# Patient Record
Sex: FEMALE | Race: WHITE | NOT HISPANIC OR LATINO | Employment: FULL TIME | URBAN - METROPOLITAN AREA
[De-identification: names, ages, dates, MRNs, and addresses within clinical notes are randomized per-mention and may not be internally consistent; named-entity substitution may affect disease eponyms.]

---

## 2017-01-03 DIAGNOSIS — G93.0 ARACHNOID CYST: Primary | ICD-10-CM

## 2017-01-04 ENCOUNTER — OFFICE VISIT (OUTPATIENT)
Dept: FAMILY MEDICINE CLINIC | Facility: CLINIC | Age: 37
End: 2017-01-04

## 2017-01-04 VITALS
RESPIRATION RATE: 16 BRPM | OXYGEN SATURATION: 98 % | BODY MASS INDEX: 34.03 KG/M2 | SYSTOLIC BLOOD PRESSURE: 118 MMHG | HEIGHT: 67 IN | DIASTOLIC BLOOD PRESSURE: 84 MMHG | WEIGHT: 216.8 LBS | TEMPERATURE: 98.5 F | HEART RATE: 86 BPM

## 2017-01-04 DIAGNOSIS — R79.0 LOW FERRITIN LEVEL: ICD-10-CM

## 2017-01-04 DIAGNOSIS — F33.42 RECURRENT MAJOR DEPRESSIVE DISORDER, IN FULL REMISSION (HCC): Primary | ICD-10-CM

## 2017-01-04 DIAGNOSIS — G43.719 INTRACTABLE CHRONIC MIGRAINE WITHOUT AURA AND WITHOUT STATUS MIGRAINOSUS: ICD-10-CM

## 2017-01-04 PROBLEM — G93.0 ARACHNOID CYST: Status: ACTIVE | Noted: 2017-01-04

## 2017-01-04 LAB
BASOPHILS # BLD AUTO: 0.03 10*3/MM3 (ref 0–0.2)
BASOPHILS NFR BLD AUTO: 0.6 % (ref 0–1.5)
EOSINOPHIL # BLD AUTO: 0.09 10*3/MM3 (ref 0–0.7)
EOSINOPHIL NFR BLD AUTO: 1.8 % (ref 0.3–6.2)
ERYTHROCYTE [DISTWIDTH] IN BLOOD BY AUTOMATED COUNT: 12.2 % (ref 11.7–13)
FERRITIN SERPL-MCNC: 8.74 NG/ML (ref 13–150)
HCT VFR BLD AUTO: 40 % (ref 35.6–45.5)
HGB BLD-MCNC: 13.1 G/DL (ref 11.9–15.5)
IMM GRANULOCYTES # BLD: 0 10*3/MM3 (ref 0–0.03)
IMM GRANULOCYTES NFR BLD: 0 % (ref 0–0.5)
IRON SATN MFR SERPL: 9 % (ref 20–50)
IRON SERPL-MCNC: 52 MCG/DL (ref 37–145)
LYMPHOCYTES # BLD AUTO: 1.56 10*3/MM3 (ref 0.9–4.8)
LYMPHOCYTES NFR BLD AUTO: 31.7 % (ref 19.6–45.3)
MCH RBC QN AUTO: 30 PG (ref 26.9–32)
MCHC RBC AUTO-ENTMCNC: 32.8 G/DL (ref 32.4–36.3)
MCV RBC AUTO: 91.7 FL (ref 80.5–98.2)
MONOCYTES # BLD AUTO: 0.35 10*3/MM3 (ref 0.2–1.2)
MONOCYTES NFR BLD AUTO: 7.1 % (ref 5–12)
NEUTROPHILS # BLD AUTO: 2.89 10*3/MM3 (ref 1.9–8.1)
NEUTROPHILS NFR BLD AUTO: 58.8 % (ref 42.7–76)
PLATELET # BLD AUTO: 372 10*3/MM3 (ref 140–500)
RBC # BLD AUTO: 4.36 10*6/MM3 (ref 3.9–5.2)
TIBC SERPL-MCNC: 556 MCG/DL
UIBC SERPL-MCNC: 504 MCG/DL
WBC # BLD AUTO: 4.92 10*3/MM3 (ref 4.5–10.7)

## 2017-01-04 PROCEDURE — 99214 OFFICE O/P EST MOD 30 MIN: CPT | Performed by: FAMILY MEDICINE

## 2017-01-04 RX ORDER — BUPROPION HYDROCHLORIDE 100 MG/1
100 TABLET ORAL 2 TIMES DAILY
Qty: 180 TABLET | Refills: 3 | Status: SHIPPED | OUTPATIENT
Start: 2017-01-04 | End: 2018-01-19

## 2017-01-04 RX ORDER — CITALOPRAM 10 MG/1
10 TABLET ORAL DAILY
Qty: 90 TABLET | Refills: 3 | Status: SHIPPED | OUTPATIENT
Start: 2017-01-04 | End: 2018-01-19 | Stop reason: SDUPTHER

## 2017-01-04 RX ORDER — DOXYCYCLINE HYCLATE 50 MG/1
324 CAPSULE, GELATIN COATED ORAL
Qty: 90 TABLET | Refills: 3 | Status: SHIPPED | OUTPATIENT
Start: 2017-01-04 | End: 2019-02-12 | Stop reason: HOSPADM

## 2017-01-04 NOTE — MR AVS SNAPSHOT
Gama Andrade   1/4/2017 10:45 AM   Office Visit    Dept Phone:  [phone]   Encounter #:  [id number]    Provider:  Taz Kang MD   Department:  [facility] PRIMARY CARE                Your Full Care Plan              Today's Medication Changes          These changes are accurate as of: 1/4/17 11:30 AM.  If you have any questions, ask your nurse or doctor.               New Medication(s)Ordered:     ferrous gluconate 324 MG tablet   Commonly known as:  FERGON   Take 1 tablet by mouth Daily With Breakfast.   Started by:  Taz Kang MD         Medication(s)that have changed:     buPROPion 100 MG tablet   Commonly known as:  WELLBUTRIN   Take 1 tablet by mouth 2 (Two) Times a Day.   What changed:  See the new instructions.   Changed by:  Taz Kang MD       citalopram 10 MG tablet   Commonly known as:  CeleXA   Take 1 tablet by mouth Daily.   What changed:  See the new instructions.   Changed by:  Taz Kang MD         Stop taking medication(s)listed here:     predniSONE 20 MG tablet   Commonly known as:  DELTASONE   Stopped by:  Taz Kang MD                Where to Get Your Medications      These medications were sent to [facility] [address] [place], [place] - [address] [place] [address] - [phone] [facility] [phone] [place] [address]     Phone:  [phone]     buPROPion 100 MG tablet    citalopram 10 MG tablet    ferrous gluconate 324 MG tablet                  Your Updated Medication List          This list is accurate as of: 1/4/17 11:30 AM.  Always use your most recent med list.                buPROPion 100 MG tablet   Commonly known as:  WELLBUTRIN   Take 1 tablet by mouth 2 (Two) Times a Day.       citalopram 10 MG tablet   Commonly known as:  CeleXA   Take 1 tablet by mouth Daily.       ferrous gluconate 324 MG tablet   Commonly known as:  FERGON   Take 1 tablet by mouth Daily With Breakfast.               We Performed  "the Following     CBC & Differential     Ferritin     Iron and TIBC       You Were Diagnosed With        Codes Comments    Recurrent major depressive disorder, in full remission    -  Primary ICD-10-CM: F33.42  ICD-9-CM: 296.36     Low ferritin level     ICD-10-CM: R79.0  ICD-9-CM: 790.6       Instructions     None    Patient Instructions History      Upcoming Appointments     Visit Type Date Time Department    OFFICE VISIT 1/4/2017 10:45 AM [facility] Signup     Our records indicate that you have an active HASH account.    You can view your After Visit Summary by going to [employer] [facility] and logging in with your [facility] username and password.  If you don't have a [facility] username and password but a parent or guardian has access to your record, the parent or guardian should login with their own [facility] username and password and access your record to view the After Visit Summary.    If you have questions, you can email [email or web address] or call [phone] to talk to our [facility] staff.  Remember, [facility] is NOT to be used for urgent needs.  For medical emergencies, dial 911.               Other Info from Your Visit           Allergies     Eletriptan      Sumatriptan  Palpitations      Reason for Visit     Depression           Vital Signs     Blood Pressure Pulse Temperature Respirations Height Weight    118/84 86 98.5 °F (36.9 °C) (Oral) 16 67\" (170.2 cm) 216 lb 12.8 oz (98.3 kg)    Oxygen Saturation Body Mass Index Smoking Status             98% 33.96 kg/m2 Never Smoker         Problems and Diagnoses Noted     Arachnoid cyst    Migraines    Low ferritin level    Recurrent major depressive disorder, in full remission        "

## 2017-01-04 NOTE — PROGRESS NOTES
"Subjective   Gaam Andrade is a 36 y.o. female.     Chief Complaint   Patient presents with   • Depression        History of Present Illness    Previous history of anxiety and major depression.  Now in remission.  She continues her medication.  Ran out of Celexa yesterday.  No adverse side effects at this time.  History of intractable headaches, followed by neurology also.  Reportedly stable..  History of asymptomatic arachnoid cyst.  History of weight loss surgery.  Gastric Bypass surgery.  Her ferritin levels have been low.  Iron levels overall okay.  Saturations low.  She's felt tired but she thinks it's from raising her foster children.  She is a single mother.  She does get help through her community.  No major menstrual issues.  She has never felt different taking iron tablets.  No adverse side effects from iron tablets.      The following portions of the patient's history were reviewed and updated as appropriate: allergies, current medications, past family history, past medical history, past social history, past surgical history and problem list.          Review of Systems   Constitutional: Positive for fatigue. Negative for activity change and appetite change.   Respiratory: Negative for chest tightness and shortness of breath.    Cardiovascular: Negative for chest pain, palpitations and leg swelling.   Neurological: Negative for headaches.   Psychiatric/Behavioral: Negative for confusion.       Objective   Blood pressure 118/84, pulse 86, temperature 98.5 °F (36.9 °C), temperature source Oral, resp. rate 16, height 67\" (170.2 cm), weight 216 lb 12.8 oz (98.3 kg), SpO2 98 %.  Physical Exam   Constitutional: She appears well-developed and well-nourished. No distress.   Neck: No thyromegaly present.   Cardiovascular: Normal rate, regular rhythm, normal heart sounds and intact distal pulses.    Pulmonary/Chest: Effort normal and breath sounds normal.   Musculoskeletal: She exhibits no edema.   Skin: Skin is " warm and dry.   Psychiatric: She has a normal mood and affect. Her behavior is normal. Judgment and thought content normal.   Nursing note and vitals reviewed.      Assessment/Plan   Gama was seen today for depression.    Diagnoses and all orders for this visit:    Recurrent major depressive disorder, in full remission  -     buPROPion (WELLBUTRIN) 100 MG tablet; Take 1 tablet by mouth 2 (Two) Times a Day.  -     citalopram (CeleXA) 10 MG tablet; Take 1 tablet by mouth Daily.    Low ferritin level  -     CBC & Differential  -     Iron and TIBC  -     Ferritin    Intractable chronic migraine without aura and without status migrainosus    Other orders  -     ferrous gluconate (FERGON) 324 MG tablet; Take 1 tablet by mouth Daily With Breakfast.      History of depression anxiety.  She continues with her medication.  Refill given.  No adverse side effects.  She continues with her mindfulness meditation therapy.  I'm recommending cognitive behavioral therapy with a psychologist.  I gave her a brochure for a group that we refer to.  She will call with questions.  Follow-up within 1 year.  Continue with her gynecologist for routine care.    History of headaches.  Overall stable.    History of gastric bypass weight loss surgery, borderline low ferritin.  Repeat iron panels today.  I'm recommending iron tablets daily, prescription sent.  Call with GI issues.

## 2017-01-05 NOTE — PROGRESS NOTES
Iron stores are lower, not unexpected.  I recommend taking the iron tablet at least once a day, twice a day if you can tolerate it.    She needs a repeat CBC and iron panel in about 3 months.  Lab visit only, although she is welcome to see me if she wishes.

## 2017-01-19 ENCOUNTER — HOSPITAL ENCOUNTER (OUTPATIENT)
Dept: MRI IMAGING | Facility: HOSPITAL | Age: 37
Discharge: HOME | End: 2017-01-19
Attending: NEUROLOGICAL SURGERY | Admitting: NEUROLOGICAL SURGERY

## 2017-01-19 DIAGNOSIS — G93.0 ARACHNOID CYST: ICD-10-CM

## 2017-01-19 DIAGNOSIS — R79.0 LOW FERRITIN LEVEL: Primary | ICD-10-CM

## 2017-01-19 DIAGNOSIS — F33.42 RECURRENT MAJOR DEPRESSIVE DISORDER, IN FULL REMISSION (HCC): ICD-10-CM

## 2017-01-19 PROCEDURE — 0 GADOBENATE DIMEGLUMINE 529 MG/ML SOLUTION: Performed by: NEUROLOGICAL SURGERY

## 2017-01-19 PROCEDURE — 70553 MRI BRAIN STEM W/O & W/DYE: CPT

## 2017-01-19 PROCEDURE — A9577 INJ MULTIHANCE: HCPCS | Performed by: NEUROLOGICAL SURGERY

## 2017-01-19 RX ADMIN — GADOBENATE DIMEGLUMINE 20 ML: 529 INJECTION, SOLUTION INTRAVENOUS at 15:15

## 2017-01-20 ENCOUNTER — OFFICE VISIT (OUTPATIENT)
Dept: ONCOLOGY | Facility: CLINIC | Age: 37
End: 2017-01-20

## 2017-01-20 ENCOUNTER — LAB (OUTPATIENT)
Dept: ONCOLOGY | Facility: HOSPITAL | Age: 37
End: 2017-01-20

## 2017-01-20 VITALS — WEIGHT: 218.4 LBS | BODY MASS INDEX: 34.28 KG/M2 | HEIGHT: 67 IN

## 2017-01-20 DIAGNOSIS — K95.89 IRON DEFICIENCY ANEMIA FOLLOWING BARIATRIC SURGERY: ICD-10-CM

## 2017-01-20 DIAGNOSIS — R79.0 LOW FERRITIN LEVEL: ICD-10-CM

## 2017-01-20 DIAGNOSIS — D50.8 IRON DEFICIENCY ANEMIA FOLLOWING BARIATRIC SURGERY: ICD-10-CM

## 2017-01-20 DIAGNOSIS — R79.0 LOW FERRITIN LEVEL: Primary | ICD-10-CM

## 2017-01-20 LAB
BASOPHILS # BLD AUTO: 0.04 10*3/MM3 (ref 0–0.2)
BASOPHILS NFR BLD AUTO: 0.5 % (ref 0–1.5)
DEPRECATED RDW RBC AUTO: 37.7 FL (ref 37–54)
EOSINOPHIL # BLD AUTO: 0.14 10*3/MM3 (ref 0–0.7)
EOSINOPHIL NFR BLD AUTO: 1.8 % (ref 0.3–6.2)
ERYTHROCYTE [DISTWIDTH] IN BLOOD BY AUTOMATED COUNT: 11.7 % (ref 11.7–13)
HCT VFR BLD AUTO: 38 % (ref 35.6–45.5)
HGB BLD-MCNC: 13 G/DL (ref 11.9–15.5)
IMM GRANULOCYTES # BLD: 0.02 10*3/MM3 (ref 0–0.03)
IMM GRANULOCYTES NFR BLD: 0.3 % (ref 0–0.5)
LYMPHOCYTES # BLD AUTO: 1.87 10*3/MM3 (ref 0.9–4.8)
LYMPHOCYTES NFR BLD AUTO: 24.1 % (ref 19.6–45.3)
MCH RBC QN AUTO: 29.7 PG (ref 26.9–32)
MCHC RBC AUTO-ENTMCNC: 34.2 G/DL (ref 32.4–36.3)
MCV RBC AUTO: 87 FL (ref 80.5–98.2)
MONOCYTES # BLD AUTO: 0.5 10*3/MM3 (ref 0.2–1.2)
MONOCYTES NFR BLD AUTO: 6.5 % (ref 5–12)
NEUTROPHILS # BLD AUTO: 5.18 10*3/MM3 (ref 1.9–8.1)
NEUTROPHILS NFR BLD AUTO: 66.8 % (ref 42.7–76)
NRBC BLD MANUAL-RTO: 0.3 /100 WBC (ref 0–0)
PLAT MORPH BLD: NORMAL
PLATELET # BLD AUTO: 298 10*3/MM3 (ref 140–500)
PMV BLD AUTO: 11.2 FL (ref 6–12)
RBC # BLD AUTO: 4.37 10*6/MM3 (ref 3.9–5.2)
RBC MORPH BLD: NORMAL
WBC MORPH BLD: NORMAL
WBC NRBC COR # BLD: 7.75 10*3/MM3 (ref 4.5–10.7)

## 2017-01-20 PROCEDURE — 99213 OFFICE O/P EST LOW 20 MIN: CPT | Performed by: INTERNAL MEDICINE

## 2017-01-20 PROCEDURE — 85025 COMPLETE CBC W/AUTO DIFF WBC: CPT | Performed by: INTERNAL MEDICINE

## 2017-01-20 PROCEDURE — 85007 BL SMEAR W/DIFF WBC COUNT: CPT | Performed by: INTERNAL MEDICINE

## 2017-01-20 PROCEDURE — 36415 COLL VENOUS BLD VENIPUNCTURE: CPT

## 2017-01-20 RX ORDER — SODIUM CHLORIDE 9 MG/ML
250 INJECTION, SOLUTION INTRAVENOUS ONCE
Status: CANCELLED | OUTPATIENT
Start: 2017-01-25

## 2017-01-20 RX ORDER — DIPHENHYDRAMINE HCL 25 MG
25 CAPSULE ORAL ONCE
Status: CANCELLED | OUTPATIENT
Start: 2017-01-25

## 2017-01-20 RX ORDER — ETONOGESTREL AND ETHINYL ESTRADIOL 11.7; 2.7 MG/1; MG/1
INSERT, EXTENDED RELEASE VAGINAL
COMMUNITY
Start: 2016-03-28 | End: 2018-01-19

## 2017-01-20 NOTE — PROGRESS NOTES
Subjective   Iron deficiency in the setting of previous gastric bypass surgery requiring IV iron therapy.   HISTORY OF PRESENT ILLNESS:   The patient is a 36-year-old female with a previous history of gastric bypass surgery in 2010.  She has required multiple doses of IV iron in the past due to recurring iron deficiency.  She had been seen in our practice in 2013 for this same issue.  She received 2 doses of IV Feraheme in May of 2013 and subsequently required 2 additional doses of IV Feraheme as an outpatient in January 2015.    She had recent labs performed on 1/4/2017 that showed she is again significantly iron deficient with a ferritin level of 8.7 and an iron saturation of 9%.  She reports being extremely fatigued.  She has not seen any external bleeding.  She's not had any other major illnesses in the past 2 years.  We will again schedule her for 2 doses of IV Feraheme as an outpatient as soon as possible.  History of Present Illness      Past Medical History, Past Surgical History, Social History, Family History have been reviewed and are without significant changes except as mentioned.    Review of Systems   Constitutional: Positive for fatigue. Negative for activity change, appetite change, fever and unexpected weight change.   HENT: Negative for hearing loss, nosebleeds, trouble swallowing and voice change.    Eyes: Negative for visual disturbance.   Respiratory: Negative for cough, shortness of breath and wheezing.    Cardiovascular: Negative for chest pain and palpitations.   Gastrointestinal: Negative for abdominal pain, diarrhea, nausea and vomiting.   Genitourinary: Negative for difficulty urinating, frequency, hematuria and urgency.   Musculoskeletal: Negative for back pain and neck pain.   Skin: Negative for rash.   Neurological: Negative for dizziness, seizures, syncope and headaches.   Hematological: Negative for adenopathy. Does not bruise/bleed easily.   Psychiatric/Behavioral: Negative for  "behavioral problems. The patient is not nervous/anxious.       A comprehensive 14 point review of systems was performed and was negative except as mentioned.    Medications:  The current medication list was reviewed in the EMR    ALLERGIES:    Allergies   Allergen Reactions   • Eletriptan    • Sumatriptan Palpitations       Objective      Vitals:    01/20/17 1456   Weight: 218 lb 6.4 oz (99.1 kg)   Height: 66.93\" (170 cm)  Comment: new ht   PainSc: 0-No pain     Current Status 1/20/2017   ECOG score 0       Physical Exam   Constitutional: She is oriented to person, place, and time. She appears well-developed and well-nourished. No distress.   HENT:   Head: Normocephalic.   Eyes: Conjunctivae and EOM are normal. Pupils are equal, round, and reactive to light. No scleral icterus.   Neck: Normal range of motion. Neck supple. No JVD present. No thyromegaly present.   Cardiovascular: Normal rate and regular rhythm.  Exam reveals no gallop and no friction rub.    No murmur heard.  Pulmonary/Chest: Effort normal and breath sounds normal. She has no wheezes. She has no rales.   Abdominal: Soft. She exhibits no distension and no mass. There is no tenderness.   Musculoskeletal: Normal range of motion. She exhibits no edema or deformity.   Lymphadenopathy:     She has no cervical adenopathy.   Neurological: She is alert and oriented to person, place, and time. She has normal reflexes. No cranial nerve deficit.   Skin: Skin is warm and dry. No rash noted. No erythema.   Psychiatric: She has a normal mood and affect. Her behavior is normal. Judgment normal.         RECENT LABS:  Hematology WBC   Date Value Ref Range Status   01/20/2017 7.75 4.50 - 10.70 10*3/mm3 Preliminary   01/04/2017 4.92 4.50 - 10.70 10*3/mm3 Final     RBC   Date Value Ref Range Status   01/20/2017 4.37 3.90 - 5.20 10*6/mm3 Preliminary   01/04/2017 4.36 3.90 - 5.20 10*6/mm3 Final     HEMOGLOBIN   Date Value Ref Range Status   01/20/2017 13.0 11.9 - 15.5 g/dL " Preliminary   01/04/2017 13.1 11.9 - 15.5 g/dL Final     HEMATOCRIT   Date Value Ref Range Status   01/20/2017 38.0 35.6 - 45.5 % Preliminary   01/04/2017 40.0 35.6 - 45.5 % Final     PLATELETS   Date Value Ref Range Status   01/20/2017 298 140 - 500 10*3/mm3 Preliminary   01/04/2017 372 140 - 500 10*3/mm3 Final            Lab Results   Component Value Date    IRON 45 06/21/2016    TIBC 556 01/04/2017    FERRITIN 8.74 (L) 01/04/2017     Iron Saturation 20 - 50 % 9 (L)           Assessment/Plan   1.  Recurring iron deficiency in a patient with previous gastric bypass surgery.  She will require additional IV iron therapy at this point.    Plan  1.  Patient will be scheduled for IV iron therapy as an outpatient with IV Feraheme 510 mg IV weekly for 2 weeks.  2.  We will not schedule routine follow-up in our office but she will continue to have her labs checked every 6 months or so at her primary care office.  She can be referred back to us in the future if she again becomes iron deficient.  It appears that she usually requires IV iron on the order of every 24 months.                  1/20/2017      CC:

## 2017-01-23 RX ORDER — BUPROPION HYDROCHLORIDE 100 MG/1
TABLET ORAL
Qty: 90 TABLET | Refills: 2 | Status: SHIPPED | OUTPATIENT
Start: 2017-01-23 | End: 2017-05-07 | Stop reason: SDUPTHER

## 2017-01-24 ENCOUNTER — INFUSION (OUTPATIENT)
Dept: ONCOLOGY | Facility: HOSPITAL | Age: 37
End: 2017-01-24

## 2017-01-24 VITALS
DIASTOLIC BLOOD PRESSURE: 80 MMHG | BODY MASS INDEX: 33.84 KG/M2 | TEMPERATURE: 98.6 F | WEIGHT: 215.6 LBS | HEART RATE: 73 BPM | SYSTOLIC BLOOD PRESSURE: 140 MMHG

## 2017-01-24 DIAGNOSIS — D50.8 IRON DEFICIENCY ANEMIA FOLLOWING BARIATRIC SURGERY: Primary | ICD-10-CM

## 2017-01-24 DIAGNOSIS — K95.89 IRON DEFICIENCY ANEMIA FOLLOWING BARIATRIC SURGERY: Primary | ICD-10-CM

## 2017-01-24 DIAGNOSIS — R79.0 LOW FERRITIN LEVEL: ICD-10-CM

## 2017-01-24 PROCEDURE — 63710000001 DIPHENHYDRAMINE PER 50 MG: Performed by: INTERNAL MEDICINE

## 2017-01-24 PROCEDURE — 96375 TX/PRO/DX INJ NEW DRUG ADDON: CPT | Performed by: INTERNAL MEDICINE

## 2017-01-24 PROCEDURE — 25010000002 FERUMOXYTOL 510 MG/17ML SOLUTION 510 MG VIAL: Performed by: INTERNAL MEDICINE

## 2017-01-24 PROCEDURE — 96374 THER/PROPH/DIAG INJ IV PUSH: CPT | Performed by: INTERNAL MEDICINE

## 2017-01-24 RX ORDER — DIPHENHYDRAMINE HCL 25 MG
25 CAPSULE ORAL ONCE
Status: COMPLETED | OUTPATIENT
Start: 2017-01-24 | End: 2017-01-24

## 2017-01-24 RX ORDER — SODIUM CHLORIDE 9 MG/ML
250 INJECTION, SOLUTION INTRAVENOUS ONCE
Status: CANCELLED | OUTPATIENT
Start: 2017-01-25

## 2017-01-24 RX ORDER — DIPHENHYDRAMINE HCL 25 MG
25 CAPSULE ORAL ONCE
Status: CANCELLED | OUTPATIENT
Start: 2017-01-25

## 2017-01-24 RX ORDER — SODIUM CHLORIDE 9 MG/ML
250 INJECTION, SOLUTION INTRAVENOUS ONCE
Status: COMPLETED | OUTPATIENT
Start: 2017-01-24 | End: 2017-01-24

## 2017-01-24 RX ADMIN — FAMOTIDINE 20 MG: 10 INJECTION, SOLUTION INTRAVENOUS at 11:03

## 2017-01-24 RX ADMIN — SODIUM CHLORIDE 250 ML: 9 INJECTION, SOLUTION INTRAVENOUS at 11:01

## 2017-01-24 RX ADMIN — FERUMOXYTOL 510 MG: 510 INJECTION INTRAVENOUS at 11:23

## 2017-01-24 RX ADMIN — DIPHENHYDRAMINE HYDROCHLORIDE 25 MG: 25 CAPSULE ORAL at 11:03

## 2017-01-31 ENCOUNTER — INFUSION (OUTPATIENT)
Dept: ONCOLOGY | Facility: HOSPITAL | Age: 37
End: 2017-01-31

## 2017-01-31 VITALS
DIASTOLIC BLOOD PRESSURE: 84 MMHG | BODY MASS INDEX: 34.06 KG/M2 | SYSTOLIC BLOOD PRESSURE: 122 MMHG | TEMPERATURE: 98.7 F | WEIGHT: 217 LBS

## 2017-01-31 DIAGNOSIS — D50.8 IRON DEFICIENCY ANEMIA FOLLOWING BARIATRIC SURGERY: Primary | ICD-10-CM

## 2017-01-31 DIAGNOSIS — R79.0 LOW FERRITIN LEVEL: ICD-10-CM

## 2017-01-31 DIAGNOSIS — K95.89 IRON DEFICIENCY ANEMIA FOLLOWING BARIATRIC SURGERY: Primary | ICD-10-CM

## 2017-01-31 PROCEDURE — 96365 THER/PROPH/DIAG IV INF INIT: CPT | Performed by: INTERNAL MEDICINE

## 2017-01-31 PROCEDURE — 63710000001 DIPHENHYDRAMINE PER 50 MG: Performed by: INTERNAL MEDICINE

## 2017-01-31 PROCEDURE — 25010000002 FERUMOXYTOL 510 MG/17ML SOLUTION 510 MG VIAL: Performed by: INTERNAL MEDICINE

## 2017-01-31 RX ORDER — SODIUM CHLORIDE 9 MG/ML
250 INJECTION, SOLUTION INTRAVENOUS ONCE
Status: CANCELLED | OUTPATIENT
Start: 2017-02-07

## 2017-01-31 RX ORDER — SODIUM CHLORIDE 9 MG/ML
250 INJECTION, SOLUTION INTRAVENOUS ONCE
Status: COMPLETED | OUTPATIENT
Start: 2017-01-31 | End: 2017-01-31

## 2017-01-31 RX ORDER — DIPHENHYDRAMINE HCL 25 MG
25 CAPSULE ORAL ONCE
Status: CANCELLED | OUTPATIENT
Start: 2017-02-07

## 2017-01-31 RX ORDER — DIPHENHYDRAMINE HCL 25 MG
25 CAPSULE ORAL ONCE
Status: COMPLETED | OUTPATIENT
Start: 2017-01-31 | End: 2017-01-31

## 2017-01-31 RX ADMIN — DIPHENHYDRAMINE HYDROCHLORIDE 25 MG: 25 CAPSULE ORAL at 10:55

## 2017-01-31 RX ADMIN — FERUMOXYTOL 510 MG: 510 INJECTION INTRAVENOUS at 11:26

## 2017-01-31 RX ADMIN — FAMOTIDINE 20 MG: 10 INJECTION, SOLUTION INTRAVENOUS at 10:55

## 2017-01-31 RX ADMIN — SODIUM CHLORIDE 250 ML: 900 INJECTION, SOLUTION INTRAVENOUS at 10:50

## 2017-05-08 RX ORDER — BUPROPION HYDROCHLORIDE 100 MG/1
TABLET ORAL
Qty: 90 TABLET | Refills: 1 | Status: SHIPPED | OUTPATIENT
Start: 2017-05-08 | End: 2017-07-09 | Stop reason: SDUPTHER

## 2017-07-10 RX ORDER — BUPROPION HYDROCHLORIDE 100 MG/1
TABLET ORAL
Qty: 90 TABLET | Refills: 1 | Status: SHIPPED | OUTPATIENT
Start: 2017-07-10 | End: 2017-10-07 | Stop reason: SDUPTHER

## 2017-10-09 RX ORDER — BUPROPION HYDROCHLORIDE 100 MG/1
TABLET ORAL
Qty: 90 TABLET | Refills: 0 | Status: SHIPPED | OUTPATIENT
Start: 2017-10-09 | End: 2017-11-02 | Stop reason: SDUPTHER

## 2017-11-02 RX ORDER — BUPROPION HYDROCHLORIDE 100 MG/1
TABLET ORAL
Qty: 90 TABLET | Refills: 1 | Status: SHIPPED | OUTPATIENT
Start: 2017-11-02 | End: 2018-01-21 | Stop reason: SDUPTHER

## 2018-01-02 RX ORDER — BUPROPION HYDROCHLORIDE 100 MG/1
TABLET ORAL
Qty: 90 TABLET | Refills: 0 | OUTPATIENT
Start: 2018-01-02

## 2018-01-02 NOTE — TELEPHONE ENCOUNTER
Patient needs appointment for further refills.  Please let patient know we can send in a bridge prescription as soon as follow-up appointment is made.

## 2018-01-19 ENCOUNTER — OFFICE VISIT (OUTPATIENT)
Dept: FAMILY MEDICINE CLINIC | Facility: CLINIC | Age: 38
End: 2018-01-19

## 2018-01-19 VITALS
SYSTOLIC BLOOD PRESSURE: 136 MMHG | WEIGHT: 191.8 LBS | BODY MASS INDEX: 30.1 KG/M2 | HEART RATE: 97 BPM | HEIGHT: 67 IN | OXYGEN SATURATION: 100 % | DIASTOLIC BLOOD PRESSURE: 84 MMHG | TEMPERATURE: 98 F

## 2018-01-19 DIAGNOSIS — D50.8 IRON DEFICIENCY ANEMIA FOLLOWING BARIATRIC SURGERY: ICD-10-CM

## 2018-01-19 DIAGNOSIS — F33.42 RECURRENT MAJOR DEPRESSIVE DISORDER, IN FULL REMISSION (HCC): ICD-10-CM

## 2018-01-19 DIAGNOSIS — F33.1 MODERATE EPISODE OF RECURRENT MAJOR DEPRESSIVE DISORDER (HCC): Primary | ICD-10-CM

## 2018-01-19 DIAGNOSIS — K95.89 IRON DEFICIENCY ANEMIA FOLLOWING BARIATRIC SURGERY: ICD-10-CM

## 2018-01-19 DIAGNOSIS — R53.83 FATIGUE, UNSPECIFIED TYPE: ICD-10-CM

## 2018-01-19 PROBLEM — F33.41 RECURRENT MAJOR DEPRESSIVE DISORDER, IN PARTIAL REMISSION (HCC): Status: ACTIVE | Noted: 2017-01-04

## 2018-01-19 LAB
ALBUMIN SERPL-MCNC: 4.1 G/DL (ref 3.5–5.2)
ALBUMIN/GLOB SERPL: 1.7 G/DL
ALP SERPL-CCNC: 90 U/L (ref 39–117)
ALT SERPL-CCNC: 20 U/L (ref 1–33)
AST SERPL-CCNC: 21 U/L (ref 1–32)
BASOPHILS # BLD AUTO: 0.02 10*3/MM3 (ref 0–0.2)
BASOPHILS NFR BLD AUTO: 0.4 % (ref 0–1.5)
BILIRUB SERPL-MCNC: 0.2 MG/DL (ref 0.1–1.2)
BUN SERPL-MCNC: 12 MG/DL (ref 6–20)
BUN/CREAT SERPL: 14.6 (ref 7–25)
CALCIUM SERPL-MCNC: 8.9 MG/DL (ref 8.6–10.5)
CHLORIDE SERPL-SCNC: 102 MMOL/L (ref 98–107)
CO2 SERPL-SCNC: 22.9 MMOL/L (ref 22–29)
CREAT SERPL-MCNC: 0.82 MG/DL (ref 0.57–1)
EOSINOPHIL # BLD AUTO: 0.08 10*3/MM3 (ref 0–0.7)
EOSINOPHIL NFR BLD AUTO: 1.7 % (ref 0.3–6.2)
ERYTHROCYTE [DISTWIDTH] IN BLOOD BY AUTOMATED COUNT: 12.2 % (ref 11.7–13)
FERRITIN SERPL-MCNC: 81.21 NG/ML (ref 13–150)
GLOBULIN SER CALC-MCNC: 2.4 GM/DL
GLUCOSE SERPL-MCNC: 97 MG/DL (ref 65–99)
HCT VFR BLD AUTO: 40.1 % (ref 35.6–45.5)
HGB BLD-MCNC: 13.1 G/DL (ref 11.9–15.5)
IMM GRANULOCYTES # BLD: 0 10*3/MM3 (ref 0–0.03)
IMM GRANULOCYTES NFR BLD: 0 % (ref 0–0.5)
IRON SATN MFR SERPL: 34 % (ref 20–50)
IRON SERPL-MCNC: 121 MCG/DL (ref 37–145)
LYMPHOCYTES # BLD AUTO: 1.37 10*3/MM3 (ref 0.9–4.8)
LYMPHOCYTES NFR BLD AUTO: 29.3 % (ref 19.6–45.3)
MCH RBC QN AUTO: 30.8 PG (ref 26.9–32)
MCHC RBC AUTO-ENTMCNC: 32.7 G/DL (ref 32.4–36.3)
MCV RBC AUTO: 94.4 FL (ref 80.5–98.2)
MONOCYTES # BLD AUTO: 0.31 10*3/MM3 (ref 0.2–1.2)
MONOCYTES NFR BLD AUTO: 6.6 % (ref 5–12)
NEUTROPHILS # BLD AUTO: 2.89 10*3/MM3 (ref 1.9–8.1)
NEUTROPHILS NFR BLD AUTO: 62 % (ref 42.7–76)
PLATELET # BLD AUTO: 297 10*3/MM3 (ref 140–500)
POTASSIUM SERPL-SCNC: 3.9 MMOL/L (ref 3.5–5.2)
PROT SERPL-MCNC: 6.5 G/DL (ref 6–8.5)
RBC # BLD AUTO: 4.25 10*6/MM3 (ref 3.9–5.2)
SODIUM SERPL-SCNC: 139 MMOL/L (ref 136–145)
TIBC SERPL-MCNC: 360 MCG/DL
TSH SERPL DL<=0.005 MIU/L-ACNC: 3.98 MIU/ML (ref 0.27–4.2)
UIBC SERPL-MCNC: 239 MCG/DL
VIT B12 SERPL-MCNC: 288 PG/ML (ref 211–946)
WBC # BLD AUTO: 4.67 10*3/MM3 (ref 4.5–10.7)

## 2018-01-19 PROCEDURE — 99214 OFFICE O/P EST MOD 30 MIN: CPT | Performed by: FAMILY MEDICINE

## 2018-01-19 RX ORDER — CITALOPRAM 10 MG/1
10 TABLET ORAL DAILY
Qty: 90 TABLET | Refills: 2 | Status: SHIPPED | OUTPATIENT
Start: 2018-01-19 | End: 2018-01-19 | Stop reason: SDUPTHER

## 2018-01-19 RX ORDER — CITALOPRAM 20 MG/1
20 TABLET ORAL DAILY
Qty: 90 TABLET | Refills: 1 | Status: SHIPPED | OUTPATIENT
Start: 2018-01-19 | End: 2019-02-12 | Stop reason: HOSPADM

## 2018-01-19 NOTE — PROGRESS NOTES
"Subjective   Gama Andrade is a 37 y.o. female.     Chief Complaint   Patient presents with   • Depression     she about 2 years ago the celexa was changed to 10 from 20 and would like too see if we can move it back up.        History of Present Illness    Depression.  Exacerbation.  Long-standing bupropion and Celexa use.  We had lowered her dose of Celexa from 20 mg a.10 mg day a few years ago.  Last few months she's had some stressors.  They no longer have foster children.  She is .  She has hypersomnolence.  She has decreased interest in pleasurable activities.  Trouble getting out of bed.  Napping in the afternoon.  More depression symptoms than some fatigue.  Not exercising much.  No undergoing to her counselor.  Lives in a safe environment.  No alcohol misuse.  No suicidal ideation.      Iron deficiency anemia related to bariatric surgery, malabsorption.  She has IV iron infusions about every year or so.  No blood in the stool.  No blood in the urine.  She states her menses have been normal.  Overdue for lab work.    The following portions of the patient's history were reviewed and updated as appropriate: allergies, current medications, past family history, past medical history, past social history, past surgical history and problem list.          Review of Systems   Constitutional: Positive for fatigue.   Respiratory: Negative.    Cardiovascular: Negative.    Musculoskeletal: Negative.    Neurological: Negative.  Negative for headaches.   Psychiatric/Behavioral: Positive for dysphoric mood.       Objective   Blood pressure 136/84, pulse 97, temperature 98 °F (36.7 °C), temperature source Oral, height 170 cm (66.93\"), weight 87 kg (191 lb 12.8 oz), SpO2 100 %.  Physical Exam   Constitutional: She appears well-developed and well-nourished. No distress.   HENT:   Head: Atraumatic.   Neck: No thyromegaly present.   Cardiovascular: Normal rate, regular rhythm, normal heart sounds and intact distal " pulses.    Pulmonary/Chest: Effort normal and breath sounds normal.   Musculoskeletal: She exhibits no edema.   Skin: Skin is warm and dry. There is pallor (??).   Psychiatric: Her behavior is normal. Judgment and thought content normal.   Affect is more flat.  Mood is more depressed.   Nursing note and vitals reviewed.      Assessment/Plan   Gama was seen today for depression.    Diagnoses and all orders for this visit:    Moderate episode of recurrent major depressive disorder  -     CBC & Differential  -     Comprehensive Metabolic Panel  -     TSH Rfx On Abnormal To Free T4  -     Iron and TIBC  -     Ferritin  -     Vitamin B12    Iron deficiency anemia following bariatric surgery  -     CBC & Differential  -     Comprehensive Metabolic Panel  -     TSH Rfx On Abnormal To Free T4  -     Iron and TIBC  -     Ferritin  -     Vitamin B12    Fatigue, unspecified type  -     CBC & Differential  -     Comprehensive Metabolic Panel  -     TSH Rfx On Abnormal To Free T4  -     Iron and TIBC  -     Ferritin  -     Vitamin B12    Recurrent major depressive disorder, in full remission  -     Discontinue: citalopram (CeleXA) 10 MG tablet; Take 1 tablet by mouth Daily.  -     citalopram (CeleXA) 20 MG tablet; Take 1 tablet by mouth Daily.        Depression.  Exacerbation.  Moderate.  Anhedonia.  Increase Celexa from 10 mg day up to 20 mg day.  Continue bupropion.  I recommend she contact her counselor.  Recommend regular exercise.  She will continue her meditation and prayer.  I will see her back in 6 months sooner as needed.    Fatigue.  Likely multifactorial.  Possible related to both depression and iron deficiency.  Next    Iron deficiency anemia.  Malabsorption.  Previous back surgery.  Check iron panel as above.  Back to hematology if needed for IV infusion.

## 2018-01-22 RX ORDER — BUPROPION HYDROCHLORIDE 100 MG/1
TABLET ORAL
Qty: 90 TABLET | Refills: 1 | Status: SHIPPED | OUTPATIENT
Start: 2018-01-22 | End: 2018-03-17 | Stop reason: SDUPTHER

## 2018-01-22 NOTE — PROGRESS NOTES
All good news.  Iron panel normal.  No anemia.  Thyroid at normal level.  No diabetes or kidney problems.

## 2018-01-29 DIAGNOSIS — F33.42 RECURRENT MAJOR DEPRESSIVE DISORDER, IN FULL REMISSION (HCC): ICD-10-CM

## 2018-01-29 RX ORDER — CITALOPRAM 10 MG/1
TABLET ORAL
Qty: 90 TABLET | Refills: 1 | Status: SHIPPED | OUTPATIENT
Start: 2018-01-29 | End: 2019-02-12 | Stop reason: HOSPADM

## 2018-03-19 RX ORDER — BUPROPION HYDROCHLORIDE 100 MG/1
TABLET ORAL
Qty: 90 TABLET | Refills: 1 | Status: SHIPPED | OUTPATIENT
Start: 2018-03-19 | End: 2019-02-12 | Stop reason: HOSPADM

## 2018-08-01 LAB
EXTERNAL GROUP B STREP ANTIGEN: NEGATIVE
EXTERNAL HEPATITIS B SURFACE ANTIGEN: NEGATIVE
EXTERNAL HEPATITIS C AB: NEGATIVE
EXTERNAL RUBELLA QUALITATIVE: NORMAL
EXTERNAL SYPHILIS RPR SCREEN: NORMAL
HIV1 P24 AG SERPL QL IA: NEGATIVE

## 2018-10-23 ENCOUNTER — TELEPHONE (OUTPATIENT)
Dept: ONCOLOGY | Facility: HOSPITAL | Age: 38
End: 2018-10-23

## 2018-10-23 NOTE — TELEPHONE ENCOUNTER
PT CALLING ABOUT RECENT LABS. FERRITIN ORDERED PER HER OB DOC AND WAS 7. PER LAST DICTATION WHENEVER SHE IS IRON DEF HER DOC CAN REFER HER BACK HERE. TOLD HER TO LET THE DOC KNOW THAT ORDERED THE LABS TO PUT IN A REFERRAL FOR HER TO BE SEEN HERE. SHE V/U.

## 2018-10-23 NOTE — TELEPHONE ENCOUNTER
----- Message from Lisa Rose sent at 10/23/2018 10:50 AM EDT -----  Contact: [phone]  Pt needs to see about getting iron infusion setup.

## 2018-10-29 ENCOUNTER — TELEPHONE (OUTPATIENT)
Dept: ONCOLOGY | Facility: CLINIC | Age: 38
End: 2018-10-29

## 2018-10-29 ENCOUNTER — TELEPHONE (OUTPATIENT)
Dept: ONCOLOGY | Facility: HOSPITAL | Age: 38
End: 2018-10-29

## 2018-10-29 DIAGNOSIS — K95.89 IRON DEFICIENCY ANEMIA FOLLOWING BARIATRIC SURGERY: Primary | ICD-10-CM

## 2018-10-29 DIAGNOSIS — D50.8 IRON DEFICIENCY ANEMIA FOLLOWING BARIATRIC SURGERY: Primary | ICD-10-CM

## 2018-10-29 PROBLEM — K91.2 POSTSURGICAL NONABSORPTION: Status: ACTIVE | Noted: 2018-10-29

## 2018-10-29 RX ORDER — SODIUM CHLORIDE 9 MG/ML
250 INJECTION, SOLUTION INTRAVENOUS ONCE
Status: CANCELLED | OUTPATIENT
Start: 2018-11-05

## 2018-10-29 RX ORDER — SODIUM CHLORIDE 9 MG/ML
250 INJECTION, SOLUTION INTRAVENOUS ONCE
Status: CANCELLED | OUTPATIENT
Start: 2018-11-12

## 2018-10-29 NOTE — TELEPHONE ENCOUNTER
"msg sent to  to see if he wants to order any iron for patient.     ----- Message from Carlene Bautista sent at 10/29/2018  1:30 PM EDT -----  Regarding: low iron  Patient called to say that she had labs drawn at [facility] (dr. Chance office) and her levels were low. She said that the doctor's office faxed over the results and that we would be in touch to set up iron infusions?    Can someone please review/take a look at and let us \"scheduling\" know if this is something we need to get set up please?    Thank you!!    Toña      "

## 2018-10-29 NOTE — TELEPHONE ENCOUNTER
msg sent to scheduling    ----- Message from Rogelio Buenrostro MD sent at 10/29/2018  2:04 PM EDT -----  Yes please. Injectafer x 2 doses 750mg each. MD or NP appt with second dose.  ----- Message -----  From: Parris Borja RN  Sent: 10/29/2018   1:38 PM  To: Rogelio Buenrostro MD    Patient has lab results from OBGYN with ferritin of 7. Do I need to get her set up for any iron?

## 2018-11-01 ENCOUNTER — INFUSION (OUTPATIENT)
Dept: ONCOLOGY | Facility: HOSPITAL | Age: 38
End: 2018-11-01

## 2018-11-01 VITALS
WEIGHT: 219 LBS | RESPIRATION RATE: 20 BRPM | SYSTOLIC BLOOD PRESSURE: 134 MMHG | DIASTOLIC BLOOD PRESSURE: 77 MMHG | TEMPERATURE: 98.1 F | HEART RATE: 72 BPM | BODY MASS INDEX: 34.37 KG/M2

## 2018-11-01 DIAGNOSIS — D50.8 IRON DEFICIENCY ANEMIA SECONDARY TO INADEQUATE DIETARY IRON INTAKE: Primary | ICD-10-CM

## 2018-11-01 DIAGNOSIS — K91.2 POSTSURGICAL NONABSORPTION: ICD-10-CM

## 2018-11-01 DIAGNOSIS — D50.8 IRON DEFICIENCY ANEMIA FOLLOWING BARIATRIC SURGERY: ICD-10-CM

## 2018-11-01 DIAGNOSIS — K95.89 IRON DEFICIENCY ANEMIA FOLLOWING BARIATRIC SURGERY: ICD-10-CM

## 2018-11-01 PROCEDURE — 96374 THER/PROPH/DIAG INJ IV PUSH: CPT | Performed by: NURSE PRACTITIONER

## 2018-11-01 PROCEDURE — 25010000002 FERRIC CARBOXYMALTOSE 750 MG/15ML SOLUTION 15 ML VIAL: Performed by: INTERNAL MEDICINE

## 2018-11-01 RX ORDER — SODIUM CHLORIDE 9 MG/ML
250 INJECTION, SOLUTION INTRAVENOUS ONCE
Status: COMPLETED | OUTPATIENT
Start: 2018-11-01 | End: 2018-11-01

## 2018-11-01 RX ADMIN — FERRIC CARBOXYMALTOSE INJECTION 750 MG: 50 INJECTION, SOLUTION INTRAVENOUS at 16:25

## 2018-11-01 RX ADMIN — SODIUM CHLORIDE 250 ML: 900 INJECTION, SOLUTION INTRAVENOUS at 16:25

## 2018-11-08 ENCOUNTER — APPOINTMENT (OUTPATIENT)
Dept: OTHER | Facility: HOSPITAL | Age: 38
End: 2018-11-08

## 2018-11-08 ENCOUNTER — OFFICE VISIT (OUTPATIENT)
Dept: ONCOLOGY | Facility: CLINIC | Age: 38
End: 2018-11-08

## 2018-11-08 DIAGNOSIS — K95.89 IRON DEFICIENCY ANEMIA FOLLOWING BARIATRIC SURGERY: Primary | ICD-10-CM

## 2018-11-08 DIAGNOSIS — D50.8 IRON DEFICIENCY ANEMIA FOLLOWING BARIATRIC SURGERY: Primary | ICD-10-CM

## 2018-11-08 DIAGNOSIS — R79.0 LOW FERRITIN LEVEL: Primary | ICD-10-CM

## 2018-11-08 DIAGNOSIS — D50.8 IRON DEFICIENCY ANEMIA SECONDARY TO INADEQUATE DIETARY IRON INTAKE: ICD-10-CM

## 2018-11-08 LAB
BASOPHILS # BLD AUTO: 0.06 10*3/MM3 (ref 0–0.2)
BASOPHILS NFR BLD AUTO: 0.6 % (ref 0–1.5)
DEPRECATED RDW RBC AUTO: 41.1 FL (ref 37–54)
EOSINOPHIL # BLD AUTO: 0.1 10*3/MM3 (ref 0–0.7)
EOSINOPHIL NFR BLD AUTO: 1 % (ref 0.3–6.2)
ERYTHROCYTE [DISTWIDTH] IN BLOOD BY AUTOMATED COUNT: 12.5 % (ref 11.7–13)
HCT VFR BLD AUTO: 37 % (ref 35.6–45.5)
HGB BLD-MCNC: 12.7 G/DL (ref 11.9–15.5)
IMM GRANULOCYTES # BLD: 0.3 10*3/MM3 (ref 0–0.03)
IMM GRANULOCYTES NFR BLD: 3.1 % (ref 0–0.5)
LYMPHOCYTES # BLD AUTO: 1.87 10*3/MM3 (ref 0.9–4.8)
LYMPHOCYTES NFR BLD AUTO: 19.2 % (ref 19.6–45.3)
MCH RBC QN AUTO: 31.8 PG (ref 26.9–32)
MCHC RBC AUTO-ENTMCNC: 34.3 G/DL (ref 32.4–36.3)
MCV RBC AUTO: 92.5 FL (ref 80.5–98.2)
MONOCYTES # BLD AUTO: 0.77 10*3/MM3 (ref 0.2–1.2)
MONOCYTES NFR BLD AUTO: 7.9 % (ref 5–12)
NEUTROPHILS # BLD AUTO: 6.62 10*3/MM3 (ref 1.9–8.1)
NEUTROPHILS NFR BLD AUTO: 68.2 % (ref 42.7–76)
NRBC BLD MANUAL-RTO: 0 /100 WBC (ref 0–0)
PLATELET # BLD AUTO: 242 10*3/MM3 (ref 140–500)
PMV BLD AUTO: 10.5 FL (ref 6–12)
RBC # BLD AUTO: 4 10*6/MM3 (ref 3.9–5.2)
WBC NRBC COR # BLD: 9.72 10*3/MM3 (ref 4.5–10.7)

## 2018-11-08 PROCEDURE — 99212-NC PR NO CHARGE CBC OFFICE OUTPATIENT VISIT 10 MINUTES: Performed by: NURSE PRACTITIONER

## 2018-11-08 PROCEDURE — 85025 COMPLETE CBC W/AUTO DIFF WBC: CPT | Performed by: INTERNAL MEDICINE

## 2018-11-08 PROCEDURE — 36415 COLL VENOUS BLD VENIPUNCTURE: CPT

## 2018-11-08 NOTE — PROGRESS NOTES
Subjective   Iron deficiency in the setting of previous gastric bypass surgery requiring IV iron therapy.   HISTORY OF PRESENT ILLNESS:   The patient is a 36-year-old female with a previous history of gastric bypass surgery in 2010.  She has required multiple doses of IV iron in the past due to recurring iron deficiency.  She had been seen in our practice in 2013 for this same issue.  She received 2 doses of IV Feraheme in May of 2013 and subsequently required 2 additional doses of IV Feraheme as an outpatient in January 2015. Most recently, she received 2 doses of IV Feraheme in January of 2017.     The patient returns today with recurrent iron deficiency anemia in the setting of pregnancy. She is entering her third trimester, currently. Her OB obtained iron studies revealing a ferritin of 7. She is fatigued and does exhibit a modest degree of exertional dyspnea. She denies any excess bleeding or bruising and her stools are normal.     She has no other concerns today.       History of Present Illness      Past Medical History, Past Surgical History, Social History, Family History have been reviewed and are without significant changes except as mentioned.    Review of Systems   Constitutional: Positive for fatigue. Negative for activity change, appetite change, fever and unexpected weight change.   HENT: Negative for hearing loss, nosebleeds, trouble swallowing and voice change.    Eyes: Negative for visual disturbance.   Respiratory: Negative for cough, shortness of breath and wheezing.    Cardiovascular: Negative for chest pain and palpitations.   Gastrointestinal: Negative for abdominal pain, diarrhea, nausea and vomiting.   Genitourinary: Negative for difficulty urinating, frequency, hematuria and urgency.   Musculoskeletal: Negative for back pain and neck pain.   Skin: Negative for rash.   Neurological: Negative for dizziness, seizures, syncope and headaches.   Hematological: Negative for adenopathy. Does not  bruise/bleed easily.   Psychiatric/Behavioral: Negative for behavioral problems. The patient is not nervous/anxious.       A comprehensive 14 point review of systems was performed and was negative except as mentioned.    Medications:  The current medication list was reviewed in the EMR    ALLERGIES:    Allergies   Allergen Reactions   • Eletriptan    • Sumatriptan Palpitations       Objective      There were no vitals filed for this visit.  Current Status 1/20/2017   ECOG score 0       Physical Exam   Constitutional: She is oriented to person, place, and time. She appears well-developed and well-nourished. No distress.   HENT:   Head: Normocephalic.   Eyes: Pupils are equal, round, and reactive to light. Conjunctivae and EOM are normal. No scleral icterus.   Neck: Normal range of motion. Neck supple. No JVD present. No thyromegaly present.   Cardiovascular: Normal rate and regular rhythm.  Exam reveals no gallop and no friction rub.    No murmur heard.  Pulmonary/Chest: Effort normal and breath sounds normal. She has no wheezes. She has no rales.   Abdominal: Soft. She exhibits no distension and no mass. There is no tenderness.   Musculoskeletal: Normal range of motion. She exhibits no edema or deformity.   Neurological: She is alert and oriented to person, place, and time. She has normal reflexes. No cranial nerve deficit.   Skin: Skin is warm and dry. No rash noted. No erythema.   Psychiatric: She has a normal mood and affect. Her behavior is normal. Judgment normal.     Exam remains unchanged from previous     RECENT LABS:  Hematology WBC   Date Value Ref Range Status   01/19/2018 4.67 4.50 - 10.70 10*3/mm3 Final   01/20/2017 7.75 4.50 - 10.70 10*3/mm3 Final     RBC   Date Value Ref Range Status   01/19/2018 4.25 3.90 - 5.20 10*6/mm3 Final   01/20/2017 4.37 3.90 - 5.20 10*6/mm3 Final     Hemoglobin   Date Value Ref Range Status   01/19/2018 13.1 11.9 - 15.5 g/dL Final   01/20/2017 13.0 11.9 - 15.5 g/dL Final      Hematocrit   Date Value Ref Range Status   01/19/2018 40.1 35.6 - 45.5 % Final   01/20/2017 38.0 35.6 - 45.5 % Final     Platelets   Date Value Ref Range Status   01/19/2018 297 140 - 500 10*3/mm3 Final   01/20/2017 298 140 - 500 10*3/mm3 Final            Lab Results   Component Value Date    IRON 45 06/21/2016    TIBC 360 01/19/2018    FERRITIN 81.21 01/19/2018     Iron Saturation 20 - 50 % 9(L)       Ferritin: 9     Assessment/Plan   1.  Recurring iron deficiency in a patient with previous gastric bypass surgery. She is presently 27 weeks pregnant and her most recent iron studies again reveal iron depletion. She received her first dose of IV Injectafer last week and tolerated it well with no complications. She will proceed with her second dose today and will call us should she require any additional dosing in the future.     Plan  1.  Patient will proceed with her second dose of IV Injectafer today   2.  We will not schedule routine follow-up in our office as she has her labs routinely check by her primary care office (most recently her OB).  She can be referred back to us in the future if she again becomes iron deficient.  It appears that she usually requires IV iron on the order of every 24 months.                  11/8/2018      CC:

## 2019-02-07 ENCOUNTER — HOSPITAL ENCOUNTER (INPATIENT)
Facility: HOSPITAL | Age: 39
LOS: 5 days | Discharge: HOME | End: 2019-02-12
Attending: OBSTETRICS & GYNECOLOGY | Admitting: OBSTETRICS & GYNECOLOGY

## 2019-02-07 PROBLEM — Z34.90 PREGNANCY: Status: ACTIVE | Noted: 2019-02-07

## 2019-02-07 LAB
ABO GROUP BLD: NORMAL
BLD GP AB SCN SERPL QL: NEGATIVE
DEPRECATED RDW RBC AUTO: 47.6 FL (ref 37–54)
ERYTHROCYTE [DISTWIDTH] IN BLOOD BY AUTOMATED COUNT: 13.6 % (ref 11.7–13)
HCT VFR BLD AUTO: 41.5 % (ref 35.6–45.5)
HGB BLD-MCNC: 13.5 G/DL (ref 11.9–15.5)
MCH RBC QN AUTO: 31.3 PG (ref 26.9–32)
MCHC RBC AUTO-ENTMCNC: 32.5 G/DL (ref 32.4–36.3)
MCV RBC AUTO: 96.3 FL (ref 80.5–98.2)
PLATELET # BLD AUTO: 213 10*3/MM3 (ref 140–500)
PMV BLD AUTO: 11.1 FL (ref 6–12)
RBC # BLD AUTO: 4.31 10*6/MM3 (ref 3.9–5.2)
RH BLD: POSITIVE
T&S EXPIRATION DATE: NORMAL
WBC NRBC COR # BLD: 8.75 10*3/MM3 (ref 4.5–10.7)

## 2019-02-07 PROCEDURE — 85027 COMPLETE CBC AUTOMATED: CPT | Performed by: OBSTETRICS & GYNECOLOGY

## 2019-02-07 PROCEDURE — 86900 BLOOD TYPING SEROLOGIC ABO: CPT | Performed by: OBSTETRICS & GYNECOLOGY

## 2019-02-07 PROCEDURE — 86850 RBC ANTIBODY SCREEN: CPT | Performed by: OBSTETRICS & GYNECOLOGY

## 2019-02-07 PROCEDURE — 86901 BLOOD TYPING SEROLOGIC RH(D): CPT | Performed by: OBSTETRICS & GYNECOLOGY

## 2019-02-07 RX ORDER — SODIUM CHLORIDE 0.9 % (FLUSH) 0.9 %
3-10 SYRINGE (ML) INJECTION AS NEEDED
Status: DISCONTINUED | OUTPATIENT
Start: 2019-02-07 | End: 2019-02-09 | Stop reason: HOSPADM

## 2019-02-07 RX ORDER — VALACYCLOVIR HYDROCHLORIDE 500 MG/1
500 TABLET, FILM COATED ORAL DAILY
COMMUNITY
End: 2019-11-05

## 2019-02-07 RX ORDER — ONDANSETRON 4 MG/1
4 TABLET, ORALLY DISINTEGRATING ORAL EVERY 6 HOURS PRN
Status: DISCONTINUED | OUTPATIENT
Start: 2019-02-07 | End: 2019-02-09 | Stop reason: HOSPADM

## 2019-02-07 RX ORDER — FAMOTIDINE 10 MG/ML
20 INJECTION, SOLUTION INTRAVENOUS EVERY 12 HOURS SCHEDULED
Status: DISCONTINUED | OUTPATIENT
Start: 2019-02-07 | End: 2019-02-09 | Stop reason: HOSPADM

## 2019-02-07 RX ORDER — ACETAMINOPHEN 325 MG/1
650 TABLET ORAL EVERY 4 HOURS PRN
Status: DISCONTINUED | OUTPATIENT
Start: 2019-02-07 | End: 2019-02-09 | Stop reason: HOSPADM

## 2019-02-07 RX ORDER — ONDANSETRON 2 MG/ML
4 INJECTION INTRAMUSCULAR; INTRAVENOUS EVERY 6 HOURS PRN
Status: DISCONTINUED | OUTPATIENT
Start: 2019-02-07 | End: 2019-02-09 | Stop reason: HOSPADM

## 2019-02-07 RX ORDER — FAMOTIDINE 20 MG/1
20 TABLET, FILM COATED ORAL EVERY 12 HOURS SCHEDULED
Status: DISCONTINUED | OUTPATIENT
Start: 2019-02-07 | End: 2019-02-09 | Stop reason: HOSPADM

## 2019-02-07 RX ORDER — TERBUTALINE SULFATE 1 MG/ML
0.25 INJECTION, SOLUTION SUBCUTANEOUS AS NEEDED
Status: DISCONTINUED | OUTPATIENT
Start: 2019-02-07 | End: 2019-02-09 | Stop reason: HOSPADM

## 2019-02-07 RX ORDER — SODIUM CHLORIDE 0.9 % (FLUSH) 0.9 %
3 SYRINGE (ML) INJECTION EVERY 12 HOURS SCHEDULED
Status: DISCONTINUED | OUTPATIENT
Start: 2019-02-07 | End: 2019-02-09 | Stop reason: HOSPADM

## 2019-02-07 RX ORDER — CETIRIZINE HYDROCHLORIDE 5 MG/1
5 TABLET ORAL DAILY
COMMUNITY
End: 2019-11-05

## 2019-02-07 RX ORDER — MISOPROSTOL 100 MCG
25 TABLET ORAL ONCE
Status: COMPLETED | OUTPATIENT
Start: 2019-02-07 | End: 2019-02-07

## 2019-02-07 RX ORDER — ONDANSETRON 4 MG/1
4 TABLET, FILM COATED ORAL EVERY 6 HOURS PRN
Status: DISCONTINUED | OUTPATIENT
Start: 2019-02-07 | End: 2019-02-09 | Stop reason: HOSPADM

## 2019-02-07 RX ORDER — BUTORPHANOL TARTRATE 1 MG/ML
1 INJECTION, SOLUTION INTRAMUSCULAR; INTRAVENOUS
Status: DISCONTINUED | OUTPATIENT
Start: 2019-02-07 | End: 2019-02-09 | Stop reason: HOSPADM

## 2019-02-07 RX ORDER — MINERAL OIL
OIL (ML) MISCELLANEOUS ONCE
Status: DISCONTINUED | OUTPATIENT
Start: 2019-02-07 | End: 2019-02-09 | Stop reason: HOSPADM

## 2019-02-07 RX ORDER — LIDOCAINE HYDROCHLORIDE 10 MG/ML
5 INJECTION, SOLUTION EPIDURAL; INFILTRATION; INTRACAUDAL; PERINEURAL AS NEEDED
Status: DISCONTINUED | OUTPATIENT
Start: 2019-02-07 | End: 2019-02-09 | Stop reason: HOSPADM

## 2019-02-07 RX ORDER — ZOLPIDEM TARTRATE 5 MG/1
5 TABLET ORAL NIGHTLY PRN
Status: DISCONTINUED | OUTPATIENT
Start: 2019-02-07 | End: 2019-02-09 | Stop reason: HOSPADM

## 2019-02-07 RX ADMIN — MISOPROSTOL 25 MCG: 100 TABLET ORAL at 22:15

## 2019-02-08 ENCOUNTER — ANESTHESIA (OUTPATIENT)
Dept: LABOR AND DELIVERY | Facility: HOSPITAL | Age: 39
End: 2019-02-08

## 2019-02-08 ENCOUNTER — ANESTHESIA EVENT (OUTPATIENT)
Dept: LABOR AND DELIVERY | Facility: HOSPITAL | Age: 39
End: 2019-02-08

## 2019-02-08 PROCEDURE — C1755 CATHETER, INTRASPINAL: HCPCS

## 2019-02-08 PROCEDURE — 25010000002 BUTORPHANOL PER 1 MG: Performed by: OBSTETRICS & GYNECOLOGY

## 2019-02-08 RX ORDER — FAMOTIDINE 10 MG/ML
20 INJECTION, SOLUTION INTRAVENOUS ONCE AS NEEDED
Status: COMPLETED | OUTPATIENT
Start: 2019-02-08 | End: 2019-02-09

## 2019-02-08 RX ORDER — ONDANSETRON 2 MG/ML
4 INJECTION INTRAMUSCULAR; INTRAVENOUS ONCE AS NEEDED
Status: COMPLETED | OUTPATIENT
Start: 2019-02-08 | End: 2019-02-09

## 2019-02-08 RX ORDER — DIPHENHYDRAMINE HCL 25 MG
25 CAPSULE ORAL EVERY 6 HOURS PRN
Status: DISCONTINUED | OUTPATIENT
Start: 2019-02-08 | End: 2019-02-09 | Stop reason: HOSPADM

## 2019-02-08 RX ORDER — MISOPROSTOL 100 MCG
25 TABLET ORAL
Status: COMPLETED | OUTPATIENT
Start: 2019-02-08 | End: 2019-02-08

## 2019-02-08 RX ORDER — SODIUM CHLORIDE, SODIUM LACTATE, POTASSIUM CHLORIDE, CALCIUM CHLORIDE 600; 310; 30; 20 MG/100ML; MG/100ML; MG/100ML; MG/100ML
125 INJECTION, SOLUTION INTRAVENOUS CONTINUOUS
Status: ACTIVE | OUTPATIENT
Start: 2019-02-08 | End: 2019-02-10

## 2019-02-08 RX ORDER — EPHEDRINE SULFATE 50 MG/ML
5 INJECTION, SOLUTION INTRAVENOUS
Status: DISCONTINUED | OUTPATIENT
Start: 2019-02-08 | End: 2019-02-09 | Stop reason: HOSPADM

## 2019-02-08 RX ORDER — OXYTOCIN-SODIUM CHLORIDE 0.9% IV SOLN 30 UNIT/500ML 30-0.9/5 UT/ML-%
2-20 SOLUTION INTRAVENOUS
Status: DISCONTINUED | OUTPATIENT
Start: 2019-02-08 | End: 2019-02-12 | Stop reason: HOSPADM

## 2019-02-08 RX ADMIN — SODIUM CHLORIDE, POTASSIUM CHLORIDE, SODIUM LACTATE AND CALCIUM CHLORIDE 125 ML/HR: 600; 310; 30; 20 INJECTION, SOLUTION INTRAVENOUS at 21:19

## 2019-02-08 RX ADMIN — MISOPROSTOL 25 MCG: 100 TABLET ORAL at 02:27

## 2019-02-08 RX ADMIN — SODIUM CHLORIDE, POTASSIUM CHLORIDE, SODIUM LACTATE AND CALCIUM CHLORIDE 125 ML/HR: 600; 310; 30; 20 INJECTION, SOLUTION INTRAVENOUS at 13:40

## 2019-02-08 RX ADMIN — MISOPROSTOL 25 MCG: 100 TABLET ORAL at 06:52

## 2019-02-08 RX ADMIN — OXYTOCIN 2 MILLI-UNITS/MIN: 10 INJECTION INTRAVENOUS at 13:40

## 2019-02-08 RX ADMIN — BUTORPHANOL TARTRATE 1 MG: 2 INJECTION, SOLUTION INTRAMUSCULAR; INTRAVENOUS at 23:19

## 2019-02-08 NOTE — PLAN OF CARE
Problem: Patient Care Overview  Goal: Plan of Care Review  Outcome: Ongoing (interventions implemented as appropriate)   02/08/19 0659   Coping/Psychosocial   Plan of Care Reviewed With patient     Goal: Individualization and Mutuality  Outcome: Ongoing (interventions implemented as appropriate)   02/08/19 0659   Individualization   Patient Specific Preferences natural birth and delivery. minimal interventions. breast feeding   Patient Specific Goals (Include Timeframe) natural non medicated delivery    Patient Specific Interventions support patient with natural labor     Goal: Discharge Needs Assessment  Outcome: Ongoing (interventions implemented as appropriate)   02/08/19 0659   Discharge Needs Assessment   Readmission Within the Last 30 Days no previous admission in last 30 days   Concerns to be Addressed no discharge needs identified   Patient/Family Anticipates Transition to home   Patient/Family Anticipated Services at Transition none   Transportation Concerns car, none   Transportation Anticipated car, drives self   Anticipated Changes Related to Illness none   Equipment Needed After Discharge none   Disability   Equipment Currently Used at Home none     Goal: Interprofessional Rounds/Family Conf  Outcome: Ongoing (interventions implemented as appropriate)   02/08/19 0659   Interdisciplinary Rounds/Family Conf   Participants nursing;physician

## 2019-02-08 NOTE — ANESTHESIA PREPROCEDURE EVALUATION
Anesthesia Evaluation                  Airway   Dental      Pulmonary    (+) asthma,   Cardiovascular         Neuro/Psych  (+) headaches (migraines), psychiatric history Depression,       ROS Comment: Arachnoid Cyst ([place]-->[id number] in size, no midline shift or hydrocephalus on 2017 MRI)  GI/Hepatic/Renal/Endo    (+) morbid obesity,      ROS Comment: S/p Gastric Bypass w/ post-surgical malabsorption    Musculoskeletal     Abdominal    Substance History      OB/GYN    (+) Pregnant,         Other   (+) blood dyscrasia (FeDef Anemia)                     Anesthesia Plan    ASA 3     epidural

## 2019-02-08 NOTE — PLAN OF CARE
Problem: Patient Care Overview  Goal: Plan of Care Review  Outcome: Ongoing (interventions implemented as appropriate)   02/08/19 1345   Coping/Psychosocial   Plan of Care Reviewed With patient;spouse     Goal: Individualization and Mutuality   02/07/19 2123 02/08/19 0659   Individualization   Patient Specific Preferences --  natural birth and delivery. minimal interventions. breast feeding   Patient Specific Goals (Include Timeframe) --  natural non medicated delivery    Patient Specific Interventions --  support patient with natural labor   Mutuality/Individual Preferences   What Anxieties, Fears, Concerns, or Questions Do You Have About Your Care? ready for it to be over --      Goal: Discharge Needs Assessment  Outcome: Ongoing (interventions implemented as appropriate)   02/08/19 0659   Discharge Needs Assessment   Readmission Within the Last 30 Days no previous admission in last 30 days   Concerns to be Addressed no discharge needs identified   Patient/Family Anticipates Transition to home   Patient/Family Anticipated Services at Transition none   Transportation Concerns car, none   Transportation Anticipated car, drives self   Anticipated Changes Related to Illness none   Equipment Needed After Discharge none   Disability   Equipment Currently Used at Home none     Goal: Interprofessional Rounds/Family Conf  Outcome: Ongoing (interventions implemented as appropriate)   02/08/19 0659   Interdisciplinary Rounds/Family Conf   Participants nursing;physician       Problem: Labor (Cervical Ripen, Induct, Augment) (Adult,Obstetrics,Pediatric)  Goal: Signs and Symptoms of Listed Potential Problems Will be Absent, Minimized or Managed (Labor)  Outcome: Ongoing (interventions implemented as appropriate)      Problem: Suicide Risk (Adult)  Goal: Identify Related Risk Factors and Signs and Symptoms  Outcome: Ongoing (interventions implemented as appropriate)   02/08/19 1813   Suicide Risk (Adult)   Related Risk Factors  (Suicide Risk) multiple stressors   Signs and Symptoms (Suicide Risk) suicidal ideation     Goal: Strength-Based Wellness/Recovery  Outcome: Ongoing (interventions implemented as appropriate)   02/08/19 1813   Suicide Risk (Adult)   Strength-Based Wellness/Recovery achieves outcome     Goal: Physical Safety  Outcome: Ongoing (interventions implemented as appropriate)   02/08/19 1813   Suicide Risk (Adult)   Physical Safety achieves outcome

## 2019-02-08 NOTE — PLAN OF CARE
Problem: Labor (Cervical Ripen, Induct, Augment) (Adult,Obstetrics,Pediatric)  Goal: Signs and Symptoms of Listed Potential Problems Will be Absent, Minimized or Managed (Labor)  Outcome: Ongoing (interventions implemented as appropriate)   02/08/19 0024   Goal/Outcome Evaluation   Problems Assessed (Labor) all   Problems Present (Labor) none

## 2019-02-08 NOTE — H&P
[facility]  Obstetric History and Physical    Patient Name: Gama Andrade  :  1980  MRN:  [id number]      Chief Complaint   Patient presents with   • Scheduled Induction     38 year old primip        Subjective     Patient is a 38 y.o. female  currently at 40w0d, who presents for induction secondary to AMA and 40wks gestation.  Her pnc is uncomplicated.     Objective       Vital Signs Range for the last 24 hours  Temperature: Temp:  [98.4 °F (36.9 °C)-98.8 °F (37.1 °C)] 98.4 °F (36.9 °C)   Temp Source: Temp src: Oral   BP: BP: (110-142)/(62-88) 138/79   Pulse: Heart Rate:  [] 80   Respirations: Resp:  [16-18] 18                   Physical Examination:     General :  Alert in NAD  Abdomen: Gravid, EFW 8pds by leopolds    Presentation:    Cervix: Exam by: Method: sterile exam per RN   Dilation: Cervical Dilation (cm): 0   Effacement: Cervical Effacement: 50-60%   Station: Fetal Station: -2       Fetal Heart Rate Assessment   Method: Fetal HR Assessment Method: external   Beats/min: Fetal HR (beats/min): 160   Baseline: Fetal Heart Baseline Rate: normal range   Varibility: Fetal HR Variability: moderate (amplitude range 6 to 25 bpm)   Accels: Fetal HR Accelerations: greater than/equal to 15 bpm, lasting at least 15 seconds   Decels: Fetal HR Decelerations: late   Tracing Category:       Uterine Assessment   Method: Method: external tocotransducer   Frequency (min): Contraction Frequency (Minutes): 1-4   Ctx Count in 10 min:     Duration:     Intensity: Contraction Intensity: moderate by palpation   Intensity by IUPC:     Resting Tone: Uterine Resting Tone: soft by palpation   Resting Tone by IUPC:     [place] Units:           Results from last 7 days   Lab Units 19  2213   WBC 10*3/mm3 8.75   HEMOGLOBIN g/dL 13.5   HEMATOCRIT % 41.5   PLATELETS 10*3/mm3 213       Assessment/Plan     1.  Intrauterine pregnancy at 40w0d weeks gestation for induction. She received three doses of cytotec  overnight and is now just starting pitocin and has   reactive, reassuring fetal status.   Continue pitocin. Anticipate .  Plan of care has been reviewed with patient and family.  All questions answered.      Pregnancy        H&P updated. The patient was examined and the following changes are noted above.         Aleyda Whitley MD  2019  2:48 PM

## 2019-02-09 LAB
ATMOSPHERIC PRESS: 769.9 MMHG
BASE EXCESS BLDCOA CALC-SCNC: -13 MMOL/L
BDY SITE: ABNORMAL
GAS FLOW AIRWAY: 4 LPM
HCO3 BLDCOA-SCNC: 19 MMOL/L (ref 22–28)
MODALITY: ABNORMAL
NOTE: ABNORMAL
PCO2 BLDCOA: 69.3 MMHG
PH BLDCOA: 7.05 PH UNITS (ref 7.18–7.34)
PO2 BLDCOA: 17.5 MMHG (ref 12–26)
SAO2 % BLDCOA: 13.2 % (ref 92–99)

## 2019-02-09 PROCEDURE — 82803 BLOOD GASES ANY COMBINATION: CPT

## 2019-02-09 PROCEDURE — C1755 CATHETER, INTRASPINAL: HCPCS | Performed by: ANESTHESIOLOGY

## 2019-02-09 PROCEDURE — 25010000003 CEFAZOLIN IN DEXTROSE 2-4 GM/100ML-% SOLUTION: Performed by: OBSTETRICS & GYNECOLOGY

## 2019-02-09 PROCEDURE — 25010000002 MORPHINE PER 10 MG: Performed by: ANESTHESIOLOGY

## 2019-02-09 PROCEDURE — 25010000002 ONDANSETRON PER 1 MG: Performed by: ANESTHESIOLOGY

## 2019-02-09 PROCEDURE — 25010000002 PHENYLEPHRINE PER 1 ML: Performed by: ANESTHESIOLOGY

## 2019-02-09 RX ORDER — DIPHENHYDRAMINE HYDROCHLORIDE 50 MG/ML
25 INJECTION INTRAMUSCULAR; INTRAVENOUS EVERY 4 HOURS PRN
Status: DISCONTINUED | OUTPATIENT
Start: 2019-02-09 | End: 2019-02-12 | Stop reason: HOSPADM

## 2019-02-09 RX ORDER — METHYLERGONOVINE MALEATE 0.2 MG/ML
200 INJECTION INTRAVENOUS ONCE AS NEEDED
Status: DISCONTINUED | OUTPATIENT
Start: 2019-02-09 | End: 2019-02-09 | Stop reason: HOSPADM

## 2019-02-09 RX ORDER — HYDROXYZINE 50 MG/1
50 TABLET, FILM COATED ORAL EVERY 6 HOURS PRN
Status: DISCONTINUED | OUTPATIENT
Start: 2019-02-09 | End: 2019-02-12 | Stop reason: HOSPADM

## 2019-02-09 RX ORDER — CEFAZOLIN SODIUM 2 G/100ML
2 INJECTION, SOLUTION INTRAVENOUS ONCE
Status: COMPLETED | OUTPATIENT
Start: 2019-02-09 | End: 2019-02-09

## 2019-02-09 RX ORDER — SIMETHICONE 80 MG
80 TABLET,CHEWABLE ORAL 4 TIMES DAILY PRN
Status: DISCONTINUED | OUTPATIENT
Start: 2019-02-09 | End: 2019-02-12 | Stop reason: HOSPADM

## 2019-02-09 RX ORDER — ACETAMINOPHEN 500 MG
1000 TABLET ORAL ONCE
Status: COMPLETED | OUTPATIENT
Start: 2019-02-09 | End: 2019-02-09

## 2019-02-09 RX ORDER — OXYTOCIN-SODIUM CHLORIDE 0.9% IV SOLN 30 UNIT/500ML 30-0.9/5 UT/ML-%
250 SOLUTION INTRAVENOUS CONTINUOUS
Status: DISPENSED | OUTPATIENT
Start: 2019-02-09 | End: 2019-02-09

## 2019-02-09 RX ORDER — SODIUM CHLORIDE 0.9 % (FLUSH) 0.9 %
1-10 SYRINGE (ML) INJECTION AS NEEDED
Status: DISCONTINUED | OUTPATIENT
Start: 2019-02-09 | End: 2019-02-12 | Stop reason: HOSPADM

## 2019-02-09 RX ORDER — BISACODYL 10 MG
10 SUPPOSITORY, RECTAL RECTAL DAILY PRN
Status: DISCONTINUED | OUTPATIENT
Start: 2019-02-09 | End: 2019-02-12 | Stop reason: HOSPADM

## 2019-02-09 RX ORDER — MORPHINE SULFATE 1 MG/ML
INJECTION, SOLUTION EPIDURAL; INTRATHECAL; INTRAVENOUS
Status: COMPLETED
Start: 2019-02-09 | End: 2019-02-09

## 2019-02-09 RX ORDER — NALOXONE HCL 0.4 MG/ML
0.4 VIAL (ML) INJECTION
Status: DISCONTINUED | OUTPATIENT
Start: 2019-02-09 | End: 2019-02-12 | Stop reason: HOSPADM

## 2019-02-09 RX ORDER — MISOPROSTOL 200 UG/1
600 TABLET ORAL ONCE AS NEEDED
Status: DISCONTINUED | OUTPATIENT
Start: 2019-02-09 | End: 2019-02-12 | Stop reason: HOSPADM

## 2019-02-09 RX ORDER — PROMETHAZINE HYDROCHLORIDE 25 MG/1
12.5 SUPPOSITORY RECTAL EVERY 6 HOURS PRN
Status: DISCONTINUED | OUTPATIENT
Start: 2019-02-09 | End: 2019-02-12 | Stop reason: HOSPADM

## 2019-02-09 RX ORDER — MORPHINE SULFATE 2 MG/ML
2 INJECTION, SOLUTION INTRAMUSCULAR; INTRAVENOUS EVERY 4 HOURS PRN
Status: DISCONTINUED | OUTPATIENT
Start: 2019-02-09 | End: 2019-02-12 | Stop reason: HOSPADM

## 2019-02-09 RX ORDER — ACETAMINOPHEN 325 MG/1
650 TABLET ORAL EVERY 4 HOURS PRN
Status: DISCONTINUED | OUTPATIENT
Start: 2019-02-09 | End: 2019-02-12 | Stop reason: HOSPADM

## 2019-02-09 RX ORDER — CARBOPROST TROMETHAMINE 250 UG/ML
250 INJECTION, SOLUTION INTRAMUSCULAR AS NEEDED
Status: DISCONTINUED | OUTPATIENT
Start: 2019-02-09 | End: 2019-02-09 | Stop reason: HOSPADM

## 2019-02-09 RX ORDER — MORPHINE SULFATE 2 MG/ML
2 INJECTION, SOLUTION INTRAMUSCULAR; INTRAVENOUS
Status: CANCELLED | OUTPATIENT
Start: 2019-02-09 | End: 2019-02-10

## 2019-02-09 RX ORDER — PHYTONADIONE 1 MG/.5ML
INJECTION, EMULSION INTRAMUSCULAR; INTRAVENOUS; SUBCUTANEOUS
Status: DISPENSED
Start: 2019-02-09 | End: 2019-02-09

## 2019-02-09 RX ORDER — IBUPROFEN 800 MG/1
800 TABLET ORAL EVERY 8 HOURS PRN
Status: DISCONTINUED | OUTPATIENT
Start: 2019-02-09 | End: 2019-02-12 | Stop reason: HOSPADM

## 2019-02-09 RX ORDER — ERYTHROMYCIN 5 MG/G
OINTMENT OPHTHALMIC
Status: DISPENSED
Start: 2019-02-09 | End: 2019-02-09

## 2019-02-09 RX ORDER — ONDANSETRON 2 MG/ML
4 INJECTION INTRAMUSCULAR; INTRAVENOUS ONCE AS NEEDED
Status: ACTIVE | OUTPATIENT
Start: 2019-02-09 | End: 2019-02-10

## 2019-02-09 RX ORDER — DIPHENHYDRAMINE HCL 25 MG
25 CAPSULE ORAL EVERY 4 HOURS PRN
Status: DISCONTINUED | OUTPATIENT
Start: 2019-02-09 | End: 2019-02-12 | Stop reason: HOSPADM

## 2019-02-09 RX ORDER — ONDANSETRON 2 MG/ML
4 INJECTION INTRAMUSCULAR; INTRAVENOUS EVERY 8 HOURS PRN
Status: DISCONTINUED | OUTPATIENT
Start: 2019-02-09 | End: 2019-02-12 | Stop reason: HOSPADM

## 2019-02-09 RX ORDER — SODIUM CHLORIDE 0.9 % (FLUSH) 0.9 %
3 SYRINGE (ML) INJECTION EVERY 12 HOURS SCHEDULED
Status: DISCONTINUED | OUTPATIENT
Start: 2019-02-09 | End: 2019-02-12 | Stop reason: HOSPADM

## 2019-02-09 RX ORDER — ONDANSETRON 4 MG/1
4 TABLET, ORALLY DISINTEGRATING ORAL EVERY 8 HOURS PRN
Status: DISCONTINUED | OUTPATIENT
Start: 2019-02-09 | End: 2019-02-12 | Stop reason: HOSPADM

## 2019-02-09 RX ORDER — PROMETHAZINE HYDROCHLORIDE 25 MG/1
25 TABLET ORAL EVERY 6 HOURS PRN
Status: DISCONTINUED | OUTPATIENT
Start: 2019-02-09 | End: 2019-02-12 | Stop reason: HOSPADM

## 2019-02-09 RX ORDER — ONDANSETRON 4 MG/1
4 TABLET, FILM COATED ORAL EVERY 8 HOURS PRN
Status: DISCONTINUED | OUTPATIENT
Start: 2019-02-09 | End: 2019-02-12 | Stop reason: HOSPADM

## 2019-02-09 RX ORDER — LIDOCAINE HYDROCHLORIDE AND EPINEPHRINE 20; 5 MG/ML; UG/ML
INJECTION, SOLUTION EPIDURAL; INFILTRATION; INTRACAUDAL; PERINEURAL AS NEEDED
Status: DISCONTINUED | OUTPATIENT
Start: 2019-02-09 | End: 2019-02-09 | Stop reason: SURG

## 2019-02-09 RX ORDER — MORPHINE SULFATE 1 MG/ML
INJECTION, SOLUTION EPIDURAL; INTRATHECAL; INTRAVENOUS AS NEEDED
Status: DISCONTINUED | OUTPATIENT
Start: 2019-02-09 | End: 2019-02-09 | Stop reason: SURG

## 2019-02-09 RX ORDER — SODIUM CHLORIDE 9 MG/ML
100 INJECTION, SOLUTION INTRAVENOUS CONTINUOUS
Status: DISCONTINUED | OUTPATIENT
Start: 2019-02-09 | End: 2019-02-12 | Stop reason: HOSPADM

## 2019-02-09 RX ORDER — OXYCODONE HYDROCHLORIDE AND ACETAMINOPHEN 5; 325 MG/1; MG/1
1 TABLET ORAL EVERY 4 HOURS PRN
Status: DISCONTINUED | OUTPATIENT
Start: 2019-02-09 | End: 2019-02-12 | Stop reason: HOSPADM

## 2019-02-09 RX ORDER — OXYTOCIN-SODIUM CHLORIDE 0.9% IV SOLN 30 UNIT/500ML 30-0.9/5 UT/ML-%
999 SOLUTION INTRAVENOUS ONCE
Status: COMPLETED | OUTPATIENT
Start: 2019-02-09 | End: 2019-02-09

## 2019-02-09 RX ORDER — DOCUSATE SODIUM 100 MG/1
100 CAPSULE, LIQUID FILLED ORAL 2 TIMES DAILY PRN
Status: DISCONTINUED | OUTPATIENT
Start: 2019-02-09 | End: 2019-02-12 | Stop reason: HOSPADM

## 2019-02-09 RX ORDER — MISOPROSTOL 200 UG/1
800 TABLET ORAL AS NEEDED
Status: DISCONTINUED | OUTPATIENT
Start: 2019-02-09 | End: 2019-02-09 | Stop reason: HOSPADM

## 2019-02-09 RX ORDER — PROMETHAZINE HYDROCHLORIDE 25 MG/ML
12.5 INJECTION, SOLUTION INTRAMUSCULAR; INTRAVENOUS EVERY 6 HOURS PRN
Status: DISCONTINUED | OUTPATIENT
Start: 2019-02-09 | End: 2019-02-12 | Stop reason: HOSPADM

## 2019-02-09 RX ORDER — NALOXONE HCL 0.4 MG/ML
0.2 VIAL (ML) INJECTION
Status: CANCELLED | OUTPATIENT
Start: 2019-02-09

## 2019-02-09 RX ORDER — SODIUM CHLORIDE, SODIUM LACTATE, POTASSIUM CHLORIDE, CALCIUM CHLORIDE 600; 310; 30; 20 MG/100ML; MG/100ML; MG/100ML; MG/100ML
125 INJECTION, SOLUTION INTRAVENOUS CONTINUOUS
Status: DISCONTINUED | OUTPATIENT
Start: 2019-02-09 | End: 2019-02-12 | Stop reason: HOSPADM

## 2019-02-09 RX ORDER — OXYCODONE AND ACETAMINOPHEN 10; 325 MG/1; MG/1
1 TABLET ORAL EVERY 4 HOURS PRN
Status: DISCONTINUED | OUTPATIENT
Start: 2019-02-09 | End: 2019-02-12 | Stop reason: HOSPADM

## 2019-02-09 RX ORDER — OXYTOCIN-SODIUM CHLORIDE 0.9% IV SOLN 30 UNIT/500ML 30-0.9/5 UT/ML-%
125 SOLUTION INTRAVENOUS CONTINUOUS PRN
Status: COMPLETED | OUTPATIENT
Start: 2019-02-09 | End: 2019-02-09

## 2019-02-09 RX ORDER — FAMOTIDINE 10 MG/ML
20 INJECTION, SOLUTION INTRAVENOUS ONCE AS NEEDED
Status: DISCONTINUED | OUTPATIENT
Start: 2019-02-09 | End: 2019-02-09 | Stop reason: HOSPADM

## 2019-02-09 RX ORDER — LANOLIN
CREAM (ML) TOPICAL
Status: DISCONTINUED | OUTPATIENT
Start: 2019-02-09 | End: 2019-02-12 | Stop reason: HOSPADM

## 2019-02-09 RX ORDER — LIDOCAINE HYDROCHLORIDE AND EPINEPHRINE 15; 5 MG/ML; UG/ML
INJECTION, SOLUTION EPIDURAL AS NEEDED
Status: DISCONTINUED | OUTPATIENT
Start: 2019-02-09 | End: 2019-02-09 | Stop reason: SURG

## 2019-02-09 RX ORDER — HYDROMORPHONE HYDROCHLORIDE 1 MG/ML
0.5 INJECTION, SOLUTION INTRAMUSCULAR; INTRAVENOUS; SUBCUTANEOUS
Status: CANCELLED | OUTPATIENT
Start: 2019-02-09

## 2019-02-09 RX ADMIN — SODIUM CHLORIDE, POTASSIUM CHLORIDE, SODIUM LACTATE AND CALCIUM CHLORIDE 125 ML/HR: 600; 310; 30; 20 INJECTION, SOLUTION INTRAVENOUS at 04:59

## 2019-02-09 RX ADMIN — LIDOCAINE HYDROCHLORIDE AND EPINEPHRINE 4 ML: 15; 5 INJECTION, SOLUTION EPIDURAL at 00:02

## 2019-02-09 RX ADMIN — SODIUM CHLORIDE, POTASSIUM CHLORIDE, SODIUM LACTATE AND CALCIUM CHLORIDE 999 ML/HR: 600; 310; 30; 20 INJECTION, SOLUTION INTRAVENOUS at 00:28

## 2019-02-09 RX ADMIN — ACETAMINOPHEN 1000 MG: 500 TABLET, FILM COATED ORAL at 05:06

## 2019-02-09 RX ADMIN — ONDANSETRON HYDROCHLORIDE 4 MG: 2 SOLUTION INTRAMUSCULAR; INTRAVENOUS at 05:06

## 2019-02-09 RX ADMIN — LIDOCAINE HYDROCHLORIDE,EPINEPHRINE BITARTRATE 5 ML: 20; .005 INJECTION, SOLUTION EPIDURAL; INFILTRATION; INTRACAUDAL; PERINEURAL at 05:10

## 2019-02-09 RX ADMIN — AZITHROMYCIN DIHYDRATE 500 MG: 500 INJECTION, POWDER, LYOPHILIZED, FOR SOLUTION INTRAVENOUS at 05:35

## 2019-02-09 RX ADMIN — EPHEDRINE SULFATE 5 MG: 50 INJECTION, SOLUTION INTRAVENOUS at 06:37

## 2019-02-09 RX ADMIN — SODIUM CHLORIDE 100 ML/HR: 9 INJECTION, SOLUTION INTRAVENOUS at 02:02

## 2019-02-09 RX ADMIN — LIDOCAINE HYDROCHLORIDE,EPINEPHRINE BITARTRATE 5 ML: 20; .005 INJECTION, SOLUTION EPIDURAL; INFILTRATION; INTRACAUDAL; PERINEURAL at 05:00

## 2019-02-09 RX ADMIN — Medication 3 ML: at 20:58

## 2019-02-09 RX ADMIN — CEFAZOLIN SODIUM 2 G: 2 INJECTION, SOLUTION INTRAVENOUS at 05:23

## 2019-02-09 RX ADMIN — IBUPROFEN 800 MG: 800 TABLET, FILM COATED ORAL at 17:10

## 2019-02-09 RX ADMIN — PHENYLEPHRINE HYDROCHLORIDE 100 MCG: 10 INJECTION INTRAVENOUS at 05:45

## 2019-02-09 RX ADMIN — EPHEDRINE SULFATE 5 MG: 50 INJECTION, SOLUTION INTRAVENOUS at 02:09

## 2019-02-09 RX ADMIN — Medication 96 ML/HR: at 00:03

## 2019-02-09 RX ADMIN — PHENYLEPHRINE HYDROCHLORIDE 100 MCG: 10 INJECTION INTRAVENOUS at 05:40

## 2019-02-09 RX ADMIN — FAMOTIDINE 20 MG: 10 INJECTION, SOLUTION INTRAVENOUS at 05:06

## 2019-02-09 RX ADMIN — SIMETHICONE 80 MG: 80 TABLET, CHEWABLE ORAL at 17:10

## 2019-02-09 RX ADMIN — EPHEDRINE SULFATE 10 MG: 50 INJECTION, SOLUTION INTRAVENOUS at 05:05

## 2019-02-09 RX ADMIN — Medication 1 APPLICATION: at 17:10

## 2019-02-09 RX ADMIN — LIDOCAINE HYDROCHLORIDE,EPINEPHRINE BITARTRATE 5 ML: 20; .005 INJECTION, SOLUTION EPIDURAL; INFILTRATION; INTRACAUDAL; PERINEURAL at 05:20

## 2019-02-09 RX ADMIN — LIDOCAINE HYDROCHLORIDE,EPINEPHRINE BITARTRATE 5 ML: 20; .005 INJECTION, SOLUTION EPIDURAL; INFILTRATION; INTRACAUDAL; PERINEURAL at 05:05

## 2019-02-09 RX ADMIN — SODIUM CHLORIDE 500 ML: 9 INJECTION, SOLUTION INTRAVENOUS at 01:06

## 2019-02-09 RX ADMIN — OXYTOCIN 125 ML/HR: 10 INJECTION INTRAVENOUS at 07:01

## 2019-02-09 RX ADMIN — OXYTOCIN 999 ML/HR: 10 INJECTION INTRAVENOUS at 05:47

## 2019-02-09 RX ADMIN — EPHEDRINE SULFATE 5 MG: 50 INJECTION, SOLUTION INTRAVENOUS at 02:24

## 2019-02-09 RX ADMIN — MORPHINE SULFATE 2.5 MG: 1 INJECTION EPIDURAL; INTRATHECAL; INTRAVENOUS at 05:05

## 2019-02-09 NOTE — ANESTHESIA POSTPROCEDURE EVALUATION
"Patient: Gama Andrade    Procedure Summary     Date:  19 Room / Location:   [facility]    Anesthesia Start:   Anesthesia Stop:  19    Procedure:   SECTION PRIMARY (N/A Abdomen) Diagnosis:      Surgeon:  Aleyda Whitley MD Provider:  Kirit Sloan MD    Anesthesia Type:  epidural ASA Status:  3          Anesthesia Type: epidural  Last vitals  BP   94/50 (19)   Temp   36.4 °C (97.6 °F) (19)   Pulse   88 (19)   Resp   16 (19)     SpO2   100 % (19)     Post Anesthesia Care and Evaluation    Patient location during evaluation: bedside  Patient participation: complete - patient participated  Level of consciousness: awake and alert  Pain management: adequate  Airway patency: patent  Anesthetic complications: No anesthetic complications    Cardiovascular status: acceptable  Respiratory status: acceptable  Hydration status: acceptable    Comments: BP 94/50   Pulse 88   Temp 36.4 °C (97.6 °F) (Oral)   Resp 16   Ht 170.2 cm (67\")   Wt 110 kg (243 lb 3.2 oz)   SpO2 100%   Breastfeeding? Yes   BMI 38.09 kg/m²           "

## 2019-02-09 NOTE — LACTATION NOTE
P1. Term infant.  Pt relaxed and infant has been nursing well so far per pt and RN.  Pt denies questions/concerns at this time but will call LC as needed.

## 2019-02-09 NOTE — ANESTHESIA PROCEDURE NOTES
Labor Epidural      Patient reassessed immediately prior to procedure    Patient location during procedure: OB  Performed By  Anesthesiologist: Kirit Sloan MD  Preanesthetic Checklist  Completed: patient identified, site marked, surgical consent, pre-op evaluation, timeout performed, IV checked, risks and benefits discussed and monitors and equipment checked  Prep:  Pt Position:sitting  Sterile Tech:gloves, mask and sterile barrier  Prep:chlorhexidine gluconate and isopropyl alcohol  Monitoring:blood pressure monitoring, continuous pulse oximetry and EKG  Epidural Block Procedure:  Approach:midline  Guidance:palpation technique  Location:L2-L3  Needle Type:Tuohy  Needle Gauge:17  Loss of Resistance Medium: saline  Loss of Resistance: 5cm  Cath Depth at skin:13 cm  Paresthesia: none  Aspiration:negative  Test Dose:negative  Number of Attempts: 1  Post Assessment:  Dressing:occlusive dressing applied and secured with tape  Pt Tolerance:patient tolerated the procedure well with no apparent complications

## 2019-02-09 NOTE — L&D DELIVERY NOTE
[facility]   Section Operative Note    Patient Name: Gama Andrade  :  1980  MRN:  [id number]      Date of procedure:  2019        Pre-Operative Dx:   1.  IUP at 40w1d weeks   2. Fetal Intolerance of Labor         Postoperative Dx:  Same        Procedure: , Low Transverse      Surgeon: Aleyda Whitley MD      Assistant: Dr [email or web address]     Anesthesia: Epidural    EBL: 1200cc         Specimens: None     Findings:                           Amniotic Fluid nl  Placenta Intact, 3 VC  Uterus normal  Tubes and ovaries normal bilaterally              Infant:            Gender: Viable female  infant     Weight: No birth weight on file.     Apgars:    @ 1 minute /        @ 5 minutes                 Indication for C/Section:     Fetal Intolerance of Labor        pt was induced for ama. She received three doses of cytotec and then switched to pitocin. During labor she had variable and late decels which resolved when pitocin was discontinuied.  She was three cm and she wished to proceed with abdominal delivery.         Procedure Details:  The patient was taken to the operating room where  anesthesia was found to be adequate. She was prepped and draped in the usual sterile manner in the dorsal supine position with leftward tilt. A Pfannenstiel incision was made and carried down to the fascia. The fascia was incised in the mid-line and extended transversely with Bryant scissors. The fascia was grasped and elevated and  from the underlying rectus muscles superiorly and inferiorly. The peritoneum was identified and entered. Peritoneal incision was extended superiorly and inferiorly with good visualization of the bladder. The bladder blade was inserted and the vesicouterine peritoneum was incised transversely and the bladder flap was created digitally. The bladder blade was reinserted. The  lower uterine segment was incised in a transverse fashion.  The head was elevated and delivered through the  incision. The remainder of the infant was delivered without difficulty. The umbilical cord was clamped and cut and the infant was handed off the field. Cord ph and cord bloods were obtained. The placenta was removed intact and appeared normal. The uterine incision was closed with running locked sutures of 0vicryl. Second layer closure was placed imbricating the first for hemostasis. The abdominal cavity was irrigated and cleared of all clot and debris. The uterine incision was inspected and noted to be hemostatic. Rectus muscles were reapproximated in the midline with 0 vicryl.  The fascia was closed with 0 vicryl in running continuous fashion. The subcutaneous tissue was closed with 3-0 vicryl. The skin was closed in subcuticular fashion with 4-0 Vicryl.   Instrument, sponge, and needle counts were correct prior the abdominal closure and at the conclusion of the case. Mother and baby  to recovery room in stable condition.              Complications:     None          Antibiotics: cefazolin (Ancef)                      Aleyda Whitley MD  2/9/2019  6:16 AM

## 2019-02-09 NOTE — NURSING NOTE
Pitocin turned off d/t to recurrent late and variable decelerations.  O2 applied by nonrebreather mask at 10L/min.  Pt. Has been repositioned and IUPC has been inserted with a 500 ml bolus amnioinfusion given.

## 2019-02-10 LAB
BASOPHILS # BLD AUTO: 0.02 10*3/MM3 (ref 0–0.2)
BASOPHILS NFR BLD AUTO: 0.2 % (ref 0–1.5)
DEPRECATED RDW RBC AUTO: 49.6 FL (ref 37–54)
EOSINOPHIL # BLD AUTO: 0.04 10*3/MM3 (ref 0–0.7)
EOSINOPHIL NFR BLD AUTO: 0.4 % (ref 0.3–6.2)
ERYTHROCYTE [DISTWIDTH] IN BLOOD BY AUTOMATED COUNT: 14.2 % (ref 11.7–13)
HCT VFR BLD AUTO: 33.1 % (ref 35.6–45.5)
HGB BLD-MCNC: 10.9 G/DL (ref 11.9–15.5)
IMM GRANULOCYTES # BLD AUTO: 0.05 10*3/MM3 (ref 0–0.03)
IMM GRANULOCYTES NFR BLD AUTO: 0.5 % (ref 0–0.5)
LYMPHOCYTES # BLD AUTO: 1.5 10*3/MM3 (ref 0.9–4.8)
LYMPHOCYTES NFR BLD AUTO: 15.1 % (ref 19.6–45.3)
MCH RBC QN AUTO: 32.1 PG (ref 26.9–32)
MCHC RBC AUTO-ENTMCNC: 32.9 G/DL (ref 32.4–36.3)
MCV RBC AUTO: 97.4 FL (ref 80.5–98.2)
MONOCYTES # BLD AUTO: 0.72 10*3/MM3 (ref 0.2–1.2)
MONOCYTES NFR BLD AUTO: 7.2 % (ref 5–12)
NEUTROPHILS # BLD AUTO: 7.66 10*3/MM3 (ref 1.9–8.1)
NEUTROPHILS NFR BLD AUTO: 77.1 % (ref 42.7–76)
PLATELET # BLD AUTO: 224 10*3/MM3 (ref 140–500)
PMV BLD AUTO: 10.8 FL (ref 6–12)
RBC # BLD AUTO: 3.4 10*6/MM3 (ref 3.9–5.2)
WBC NRBC COR # BLD: 9.94 10*3/MM3 (ref 4.5–10.7)

## 2019-02-10 PROCEDURE — 85025 COMPLETE CBC W/AUTO DIFF WBC: CPT | Performed by: OBSTETRICS & GYNECOLOGY

## 2019-02-10 RX ADMIN — OXYCODONE AND ACETAMINOPHEN 1 TABLET: 5; 325 TABLET ORAL at 06:40

## 2019-02-10 RX ADMIN — OXYCODONE AND ACETAMINOPHEN 1 TABLET: 5; 325 TABLET ORAL at 11:53

## 2019-02-10 RX ADMIN — OXYCODONE AND ACETAMINOPHEN 1 TABLET: 5; 325 TABLET ORAL at 20:21

## 2019-02-10 RX ADMIN — OXYCODONE AND ACETAMINOPHEN 1 TABLET: 5; 325 TABLET ORAL at 16:01

## 2019-02-10 RX ADMIN — IBUPROFEN 800 MG: 800 TABLET, FILM COATED ORAL at 06:40

## 2019-02-10 RX ADMIN — IBUPROFEN 800 MG: 800 TABLET, FILM COATED ORAL at 16:02

## 2019-02-10 NOTE — NURSING NOTE
According to prenatal labs from 8-1-18, pt is rubella non-immune.  Changed status to non-immune in results console.

## 2019-02-10 NOTE — LACTATION NOTE
Pt having troubles getting infant latched to left breast.  Areola has edema and is difficult to grasp.  Reviewed reverse pressure softening and pt will hand express to stimulate until infant is able to grasp breast.  Reviewed diet, feeding patterns, and how to know infant getting enough.  Encouraged pt to call LC as needed.    Lactation Consult Note    Evaluation Completed: 2/10/2019 12:40 PM  Patient Name: Gama Andrade  :  1980  MRN:  [id number]     REFERRAL  INFORMATION:                          Date of Referral: 02/10/19   Person Making Referral: patient  Maternal Reason for Referral: latch painful       DELIVERY HISTORY:          Skin to skin initiation date/time:        Skin to skin end date/time:              MATERNAL ASSESSMENT:  Breast Size Issue: none (02/10/19 1231 : Lisa Hamlin, RN)  Breast Shape: Bilateral:, pendulous(heavy) (02/10/19 1231 : Lisa Hamlin, RN)  Breast Density: Bilateral:, soft (02/10/19 1231 : Lisa Hamlin, RN)  Areola: Right:, elastic, Left:, dense (02/10/19 1231 : Lisa Hamlin, RN)  Nipples: everted (02/10/19 1231 : Lisa Hamlin, RN)                INFANT ASSESSMENT:  Information for the patient's :  Andrew Andrade [[id number]]   No past medical history on file.    Feeding Readiness Cues: eager (02/10/19 1200 : Lisa Hamlin, RN)   Feeding Method: breastfeeding (02/10/19 1200 : Lisa Hamlin, RN)   Feeding Tolerance/Success: adequate pause for breath, coordinated suck, coordinated swallow (02/10/19 1200 : Lisa Hamlin, RN)       Satiety Cues: calm after feeding (02/10/19 1200 : Lisa Hamlin, RN)           Feeding Interventions: lips stroked, latch assistance provided (02/10/19 1200 : Lisa Hamlin, RN)   Nutrition Interventions: lactation consult initiated (02/10/19 1200 : Lisa Hamlin, RN)   Additional Documentation: LATCH Score (Group) (02/10/19 1200 : Lisa Hamlin, RN)           Breastfeeding: breastfeeding, bilateral (02/10/19 1200 :  Lisa Hamlin, RN)   Infant Positioning: clutch/football (02/10/19 1200 : Lisa Hamlin, LACHELLE)           Effective Latch During Feeding: yes (02/10/19 1200 : Lisa Hamlin RN)   Suck/Swallow Coordination: present (02/10/19 1200 : Lisa Hmalin RN)   Signs of Milk Transfer: infant jaw motion present, audible swallow (02/10/19 1200 : Lisa Hamlin, RN)       Latch: 2-->grasps breast, tongue down, lips flanged, rhythmic sucking (02/10/19 1200 : Lisa Hamlin, RN)   Audible Swallowin-->spontaneous and intermittent (24 hrs old) (02/10/19 1200 : Lisa Hamlin RN)   Type of Nipple: 2-->everted (after stimulation) (02/10/19 1200 : Lisa Hamlin RN)   Comfort (Breast/Nipple): 2-->soft/nontender (02/10/19 1200 : Lisa Hamlin, RN)   Hold (Positioning): 1-->minimal assist, teach one side, mother does other, staff holds (02/10/19 1200 : Lisa Hamlin, RN)   Latch Score: 9 (02/10/19 1200 : Lisa Hamlin RN)     Infant-Driven Feeding Scales - Readiness: Alert or fussy prior to care. Rooting and/or hands to mouth behavior. Good tone. (02/10/19 1200 : Lisa Hamlin, RN)   Infant-Driven Feeding Scales - Quality: Nipples with a strong coordinated SSB throughout feed. (02/10/19 1200 : Lisa Hamlin, LACHELLE)             MATERNAL INFANT FEEDING:  Maternal Preparation: breast care (02/10/19 1231 : Lisa Hamlin, LACHELLE)  Maternal Emotional State: relaxed (02/10/19 1231 : Lisa Hamlin, RN)  Infant Positioning: clutch/football (02/10/19 1231 : Lisa Hamlin, RN)   Signs of Milk Transfer: infant jaw motion present (02/10/19 1231 : Lisa Hamlin, RN)  Pain with Feeding: no (02/10/19 1231 : Lisa Hamlin, RN)        Comfort Measures Before/During Feeding: infant position adjusted, latch adjusted (02/10/19 1231 : Lisa Hamlin, RN)  Milk Ejection Reflex: present (02/10/19 1231 : Lisa Hamlin, RN)  Comfort Measures Following Feeding: air-drying encouraged, expressed milk applied (02/10/19 123 : Lisa Hamlin, RN)         Latch Assistance: yes (02/10/19 1231 : Lisa Hamlin, RN)                               EQUIPMENT TYPE:             Breast Care: Breastfeeding: breast milk to nipples, open to air, milk massaged towards nipple (02/10/19 1231 : Lisa Hamlin, RN)        Breastfeeding Support: encouragement provided, lactation counseling provided, maternal nutrition promoted, maternal rest encouraged, maternal hydration promoted, infant-mother separation minimized (02/10/19 1231 : Lisa Hamlin, RN)          BREAST PUMPING:          LACTATION REFERRALS:

## 2019-02-10 NOTE — PROGRESS NOTES
[facility]   PROGRESS NOTE    Patient Name: Gama Andrade  :  1980  MRN:  [id number]      Post-Op 1 S/P   Subjective     Patient reports:   Doing well. Pain well controlled. Tolerating regular diet and having flatus.    Lochia normal. Female infant. Breastfeeding.      Objective       Vitals: Vital Signs Range for the last 24 hours  Temperature: Temp:  [98.2 °F (36.8 °C)-98.9 °F (37.2 °C)] 98.3 °F (36.8 °C)       BP: BP: (110-125)/(67-78) 116/73   Pulse: Heart Rate:  [78-94] 82   Respirations: Resp:  [16-18] 16                 Rubella: Immune                        Physical Exam     General Alert       Abdomen Soft, non-distended, fundus firm, 1 below umbilicus, appropriately tender      Incision  Intact, no erythema or exudate      Extremities Calves NT bilaterally                Assessment/Plan  :   POD 1  -  Doing well.  Continue usual cares.           Pregnancy               VIRGIL Pascal  2/10/2019  9:22 AM

## 2019-02-11 RX ADMIN — OXYCODONE AND ACETAMINOPHEN 1 TABLET: 5; 325 TABLET ORAL at 06:09

## 2019-02-11 RX ADMIN — OXYCODONE AND ACETAMINOPHEN 1 TABLET: 5; 325 TABLET ORAL at 19:47

## 2019-02-11 RX ADMIN — IBUPROFEN 800 MG: 800 TABLET, FILM COATED ORAL at 10:58

## 2019-02-11 RX ADMIN — OXYCODONE AND ACETAMINOPHEN 1 TABLET: 5; 325 TABLET ORAL at 01:53

## 2019-02-11 RX ADMIN — OXYCODONE AND ACETAMINOPHEN 1 TABLET: 5; 325 TABLET ORAL at 10:58

## 2019-02-11 RX ADMIN — IBUPROFEN 800 MG: 800 TABLET, FILM COATED ORAL at 01:53

## 2019-02-11 RX ADMIN — IBUPROFEN 800 MG: 800 TABLET, FILM COATED ORAL at 19:47

## 2019-02-11 RX ADMIN — DOCUSATE SODIUM 100 MG: 100 CAPSULE, LIQUID FILLED ORAL at 10:58

## 2019-02-11 RX ADMIN — DOCUSATE SODIUM 100 MG: 100 CAPSULE, LIQUID FILLED ORAL at 19:47

## 2019-02-11 NOTE — ADDENDUM NOTE
Addendum  created 02/10/19 1920 by Kirit Sloan MD    Visit Navigator Flowsheet section accepted

## 2019-02-11 NOTE — LACTATION NOTE
This note was copied from a baby's chart.  Breast feeding going better after working with LC yesterday and baby is cluster feeding now per Mom. Noted 8% weight and Mom reports visible colostrum. Discussed feeding patterns, baby's output and encouraged to call for any assistance.

## 2019-02-11 NOTE — PROGRESS NOTES
[facility]   PROGRESS NOTE    Patient Name: Gama Andrade  :  1980  MRN:  [id number]      Post-Op 2 S/P   Subjective     Patient reports:   Doing well. Pain well controlled. Tolerating regular diet and having flatus.    Lochia normal.       Objective       Vitals: Vital Signs Range for the last 24 hours  Temperature: Temp:  [98 °F (36.7 °C)-98.1 °F (36.7 °C)] 98 °F (36.7 °C)       BP: BP: (111-130)/(71-81) 130/81   Pulse: Heart Rate:  [76-78] 76   Respirations: Resp:  [16] 16                                         Physical Exam     General Alert       Abdomen Soft, non-distended, fundus firm, 1 below umbilicus, appropriately tender      Incision  Intact, no erythema or exudate      Extremities Calves NT bilaterally                Assessment/Plan  :   POD 2  -  Doing well.  Continue usual cares.     Has had BM.   C/o more burning/ sharp on rt side incision this am-- encouraged binder.            Pregnancy               Pamela May, VIRGIL  2019  9:00 AM

## 2019-02-11 NOTE — PLAN OF CARE
Problem: Patient Care Overview  Goal: Plan of Care Review  Outcome: Ongoing (interventions implemented as appropriate)   19   Coping/Psychosocial   Plan of Care Reviewed With patient   Plan of Care Review   Progress improving   OTHER   Outcome Summary VS stable, assessment wnl, pain controlled with po meds, up ad christian, breastfeeding        Problem: Postpartum ( Delivery) (Adult,Obstetrics,Pediatric)  Goal: Signs and Symptoms of Listed Potential Problems Will be Absent, Minimized or Managed (Postpartum)  Outcome: Ongoing (interventions implemented as appropriate)   19   Goal/Outcome Evaluation   Problems Assessed (Postpartum ) all   Problems Present (Postpartum ) none       Problem: Breastfeeding (Adult,Obstetrics,Pediatric)  Goal: Signs and Symptoms of Listed Potential Problems Will be Absent, Minimized or Managed (Breastfeeding)  Outcome: Ongoing (interventions implemented as appropriate)   19   Goal/Outcome Evaluation   Problems Assessed (Breastfeeding) all   Problems Present (Breastfeeding) none

## 2019-02-12 VITALS
WEIGHT: 243.2 LBS | TEMPERATURE: 98.2 F | HEIGHT: 67 IN | SYSTOLIC BLOOD PRESSURE: 129 MMHG | OXYGEN SATURATION: 98 % | HEART RATE: 73 BPM | BODY MASS INDEX: 38.17 KG/M2 | DIASTOLIC BLOOD PRESSURE: 76 MMHG | RESPIRATION RATE: 16 BRPM

## 2019-02-12 PROBLEM — Z34.90 PREGNANCY: Status: RESOLVED | Noted: 2019-02-07 | Resolved: 2019-02-12

## 2019-02-12 RX ORDER — OXYCODONE HYDROCHLORIDE AND ACETAMINOPHEN 5; 325 MG/1; MG/1
2 TABLET ORAL EVERY 4 HOURS PRN
Qty: 30 TABLET | Refills: 0 | Status: SHIPPED | OUTPATIENT
Start: 2019-02-12 | End: 2019-02-15

## 2019-02-12 RX ORDER — IBUPROFEN 800 MG/1
800 TABLET ORAL EVERY 8 HOURS PRN
Qty: 30 TABLET | Refills: 3 | Status: SHIPPED | OUTPATIENT
Start: 2019-02-12 | End: 2019-11-05

## 2019-02-12 RX ADMIN — OXYCODONE AND ACETAMINOPHEN 1 TABLET: 5; 325 TABLET ORAL at 11:38

## 2019-02-12 RX ADMIN — IBUPROFEN 800 MG: 800 TABLET, FILM COATED ORAL at 05:35

## 2019-02-12 RX ADMIN — OXYCODONE AND ACETAMINOPHEN 1 TABLET: 5; 325 TABLET ORAL at 05:34

## 2019-02-12 RX ADMIN — MEASLES, MUMPS, AND RUBELLA VIRUS VACCINE LIVE 0.5 ML: 1000; 12500; 1000 INJECTION, POWDER, LYOPHILIZED, FOR SUSPENSION SUBCUTANEOUS at 11:16

## 2019-02-12 RX ADMIN — DOCUSATE SODIUM 100 MG: 100 CAPSULE, LIQUID FILLED ORAL at 11:38

## 2019-02-12 NOTE — PLAN OF CARE
Problem: Patient Care Overview  Goal: Plan of Care Review  Outcome: Ongoing (interventions implemented as appropriate)   19 1057   Coping/Psychosocial   Plan of Care Reviewed With patient   Plan of Care Review   Progress improving   OTHER   Outcome Summary VSS, incision healing,vding, up ad christian, taking percocet and motrin for pain, baby breastfeeding well     Goal: Individualization and Mutuality  Outcome: Ongoing (interventions implemented as appropriate)   19 1057   Individualization   Patient Specific Preferences breastfeeding only   Patient Specific Goals (Include Timeframe) home today       Problem: Postpartum ( Delivery) (Adult,Obstetrics,Pediatric)  Goal: Signs and Symptoms of Listed Potential Problems Will be Absent, Minimized or Managed (Postpartum)  Outcome: Ongoing (interventions implemented as appropriate)   19 1057   Goal/Outcome Evaluation   Problems Assessed (Postpartum ) all   Problems Present (Postpartum ) none       Problem: Breastfeeding (Adult,Obstetrics,Pediatric)  Goal: Signs and Symptoms of Listed Potential Problems Will be Absent, Minimized or Managed (Breastfeeding)  Outcome: Ongoing (interventions implemented as appropriate)   19 1057   Goal/Outcome Evaluation   Problems Assessed (Breastfeeding) all   Problems Present (Breastfeeding) none

## 2019-02-12 NOTE — PLAN OF CARE
Problem: Patient Care Overview  Goal: Plan of Care Review  Outcome: Ongoing (interventions implemented as appropriate)   19 0241   Coping/Psychosocial   Plan of Care Reviewed With patient;spouse   Plan of Care Review   Progress improving   OTHER   Outcome Summary VSS, incision cdi, pain controlled with po medication, encouraged patient to ambulate, discussed breastfeeding patterns with patient and spouse, reassurance given       Problem: Suicide Risk (Adult)  Goal: Identify Related Risk Factors and Signs and Symptoms  Outcome: Ongoing (interventions implemented as appropriate)      Problem: Postpartum ( Delivery) (Adult,Obstetrics,Pediatric)  Goal: Signs and Symptoms of Listed Potential Problems Will be Absent, Minimized or Managed (Postpartum)  Outcome: Ongoing (interventions implemented as appropriate)   19 0241   Goal/Outcome Evaluation   Problems Assessed (Postpartum ) all   Problems Present (Postpartum ) none       Problem: Breastfeeding (Adult,Obstetrics,Pediatric)  Goal: Signs and Symptoms of Listed Potential Problems Will be Absent, Minimized or Managed (Breastfeeding)  Outcome: Ongoing (interventions implemented as appropriate)   19 0241   Goal/Outcome Evaluation   Problems Assessed (Breastfeeding) all   Problems Present (Breastfeeding) none

## 2019-02-12 NOTE — LACTATION NOTE
This note was copied from a baby's chart.  D/c today. Breast feeding going well and her milk is coming in per Mom. [facility] card given if needing f/u.

## 2019-02-12 NOTE — DISCHARGE SUMMARY
Date of Discharge:  2019    Discharge Diagnosis: primary c/s    Presenting Problem/History of Present Illness  Pregnancy [Z34.90]       Hospital Course  Patient is a 38 y.o. female presented for IOL.   Underwent primary c/s d/t fetal intolerance to labor.      Procedures Performed  Procedure(s):   SECTION PRIMARY         Condition on Discharge:  Post-Op Day 3 S/P   Subjective     Patient reports:    Doing well - ready for discharge. Pain well controlled. Tolerating po and   having flatus. Voiding and ambulating without difficulty. Lochia normal.     Vital Signs  Temp:  [97.9 °F (36.6 °C)-98.2 °F (36.8 °C)] 98.2 °F (36.8 °C)  Heart Rate:  [70-73] 73  Resp:  [16] 16  BP: (129-149)/(76-85) 129/76    Physical Exam    Gen Alert and awake   Abdomen Soft, ND,  Fundus firm with minimal tenderness   Incision  Intact, without erythema or exudate   Extremities Calves NT bilaterally     Assessment/Plan ]   Assessment:  POD 3  -  Doing well. Stable for discharge.     Hx depression-- previously on wellbutrin/ celexa.   Wants to use zoloft while breastfeeding-- has used in past-- will start    Rubella non-immune-- MMR prior to d/c    Plan:    Instructions reviewed       Consults:   Consults     No orders found from 2019 to 2019.          Discharge Disposition  Home or Self Care    Discharge Medications     Discharge Medications      New Medications      Instructions Start Date   ibuprofen 800 MG tablet  Commonly known as:  ADVIL,MOTRIN   800 mg, Oral, Every 8 Hours PRN      oxyCODONE-acetaminophen 5-325 MG per tablet  Commonly known as:  PERCOCET   2 tablets, Oral, Every 4 Hours PRN         Continue These Medications      Instructions Start Date   cetirizine 5 MG tablet  Commonly known as:  zyrTEC   5 mg, Oral, Daily      PRENATAL VITAMINS PO   1 tablet, Oral, Daily      valACYclovir 500 MG tablet  Commonly known as:  VALTREX   500 mg, Oral, Daily         Stop These Medications    buPROPion 100 MG  tablet  Commonly known as:  WELLBUTRIN     citalopram 10 MG tablet  Commonly known as:  CeleXA     citalopram 20 MG tablet  Commonly known as:  CeleXA     ferrous gluconate 324 MG tablet  Commonly known as:  FERGON            The patient has been prescribed a controlled substance.  She has been counseled on the risks associated with using the medication.   The addictive potential of this medication and alternatives were discussed carefully with this patient and she demonstrated understanding.  A [facility]ARA report has been obtained and reviewed.    Activity at Discharge:     Follow-up Appointments  No future appointments.      Test Results Pending at Discharge       Pamela May, VIRGIL  02/12/19  8:54 AM

## 2019-02-14 ENCOUNTER — HOSPITAL ENCOUNTER (OUTPATIENT)
Dept: LACTATION | Facility: HOSPITAL | Age: 39
Discharge: HOME | End: 2019-02-14

## 2019-02-14 NOTE — LACTATION NOTE
P1. Gama and baby Meredith here today to ascertain deep latch . Baby has gained back 3 ounces from lowest weight. Mom is very relaxed and does well latching baby .   Mom has history of bariatric sx and breast are very long and wide. Nipples are well everted and we worked on getting a good deep latch as Meredith favored just taking the nipple in.  Mom to insurance pump twice a day until breasts are more full and Meredith is feeding vigorously each feeding.     Birthweight 7-13.9  Lowest weight 7-0.4 today at ped's office ( DOL5) 7-4  Here prefeed 7-3.6 ( in dry diaper)  Postfeed 7-4.8  32cc taken .   Bilateral nursing     Lactation Consult Note    Evaluation Completed: 2019 3:36 PM  Patient Name: Gama Andrade  :  1980  MRN:  [id number]     REFERRAL  INFORMATION:                          Date of Referral: 19   Person Making Referral: nurse  Maternal Reason for Referral: breastfeeding currently, bariatric surgery history, maternal age advanced       DELIVERY HISTORY:          Skin to skin initiation date/time:        Skin to skin end date/time:              MATERNAL ASSESSMENT:     Breast Shape: pendulous (19 1435 : Janelle Hawk, RN)  Breast Density: soft (19 1435 : Janelle Hawk, RN)  Areola: elastic (19 1435 : Janelle Hawk, RN)  Nipples: everted (19 1435 : Janelle Hawk, RN)                INFANT ASSESSMENT:  Information for the patient's :  Meredith Parker [[id number]]   No past medical history on file.                                                                                                                                MATERNAL INFANT FEEDING:  Maternal Preparation: breast care, hand hygiene (19 1435 : Janelle Hawk, RN)  Maternal Emotional State: assist needed (19 1435 : Janelle Hawk, RN)  Infant Positioning: clutch/football, cross-cradle (19 1435 : Janelle Hawk, RN)   Signs  of Milk Transfer: audible swallow, infant jaw motion present, suck/swallow ratio (02/14/19 1435 : Janelle Hawk, RN)  Pain with Feeding: no (02/14/19 1435 : Janelle Hawk, RN)           Milk Ejection Reflex: present (02/14/19 1435 : Janelle Hawk RN)  Comfort Measures Following Feeding: air-drying encouraged, expressed milk applied, water cleansing encouraged (02/14/19 1435 : Janelle Hawk RN)        Latch Assistance: yes (02/14/19 1435 : Janelle Hawk RN)                               EQUIPMENT TYPE:  Breast Pump Type: double electric, personal (02/14/19 1435 : Janelle Hawk RN)  Breast Pump Flange Type: hard (02/14/19 1435 : Janelle Hawk, RN)  Breast Pump Flange Size: 27 mm (02/14/19 1435 : Janelle Hawk, RN)                        BREAST PUMPING:          LACTATION REFERRALS:  Lactation Referrals: outpatient lactation program (02/14/19 1435 : Janelle Hawk, RN)

## 2019-11-05 ENCOUNTER — OFFICE VISIT (OUTPATIENT)
Dept: FAMILY MEDICINE CLINIC | Facility: CLINIC | Age: 39
End: 2019-11-05

## 2019-11-05 VITALS
TEMPERATURE: 98.2 F | BODY MASS INDEX: 34.44 KG/M2 | OXYGEN SATURATION: 99 % | SYSTOLIC BLOOD PRESSURE: 113 MMHG | HEART RATE: 69 BPM | HEIGHT: 67 IN | DIASTOLIC BLOOD PRESSURE: 80 MMHG | WEIGHT: 219.4 LBS

## 2019-11-05 DIAGNOSIS — B30.9 VIRAL CONJUNCTIVITIS OF RIGHT EYE: Primary | ICD-10-CM

## 2019-11-05 PROCEDURE — 99213 OFFICE O/P EST LOW 20 MIN: CPT | Performed by: FAMILY MEDICINE

## 2019-11-05 RX ORDER — KETOROLAC TROMETHAMINE 5 MG/ML
1 SOLUTION OPHTHALMIC 4 TIMES DAILY PRN
Qty: 1 EACH | Refills: 0 | Status: SHIPPED | OUTPATIENT
Start: 2019-11-05 | End: 2021-11-04

## 2019-11-05 RX ORDER — POLYMYXIN B SULFATE AND TRIMETHOPRIM 1; 10000 MG/ML; [USP'U]/ML
SOLUTION OPHTHALMIC
COMMUNITY
Start: 2019-11-03 | End: 2020-11-06 | Stop reason: SDDI

## 2019-11-05 NOTE — PROGRESS NOTES
"Subjective   Gama Andrade is a 39 y.o. female.     Chief Complaint   Patient presents with   • Conjunctivitis     right eye and painfulx sun went to the [facility] clinic and given eye drops         History of Present Illness    Saturday morning woke up with the right eye giving her some trouble.  She went to an urgent care center on Sunday.  Prescribed trimethoprim eyedrops.  Using every 3 hours while awake.  Every morning is been pasted shot.  This morningis gotten swollen.  She is had no fever.  No hot redness.  Tends to clear up later in the day.  No foreign body sensation but some eye pain.  Her baby has had a runny nose and is in .  Patient has no congestion sore throat or fever otherwise feels fine.      The following portions of the patient's history were reviewed and updated as appropriate: allergies, current medications, past family history, past medical history, past social history, past surgical history and problem list.          Review of Systems   Constitutional: Negative for fever.   HENT: Negative for congestion and sore throat.    Eyes: Positive for pain, discharge, redness and itching. Negative for photophobia.   Respiratory: Negative for cough.        Objective   Blood pressure 113/80, pulse 69, temperature 98.2 °F (36.8 °C), temperature source Oral, height 170.2 cm (67.01\"), weight 99.5 kg (219 lb 6.4 oz), SpO2 99 %, currently breastfeeding.  Physical Exam   Constitutional: No distress.   HENT:   Head: Atraumatic.   Eyes: Scleral icterus is present.   The conjunctiva of the right eye is injected peripherally.  The cornea is unaffected.  No corneal lesions seen.  No eye discharge currently.  She states it has been \"goopy\" in the morning.  No photophobia.  Normal pupil response.  Extraocular muscle movements intact all directions.  There is no abscess.  No stye.  The upper and lower eyelids are edematous but only minimally erythematous.  No hot erythema.  No preauricular lymph node.  Pharynx " unremarkable.   Neck: Normal range of motion. Neck supple.   Lymphadenopathy:     She has no cervical adenopathy.       Assessment/Plan   Gama was seen today for conjunctivitis.    Diagnoses and all orders for this visit:    Viral conjunctivitis of right eye    Other orders  -     ketorolac (ACULAR) 0.5 % ophthalmic solution; Administer 1 drop to the right eye 4 (Four) Times a Day As Needed (eye irritation).      Probable non-herpetic viral conjunctivitis of the right eye.  Stop antibiotic drops.  Likely not helping and may be causing some irritation.  Acular eyedrops as needed for eye irritation.  Warm compresses.  If getting worse into tomorrow afternoon, let us know and we will prescribe gentamicin eyedrops.  At this time low probability of bacterial gingivitis.

## 2020-10-07 ENCOUNTER — APPOINTMENT (OUTPATIENT)
Dept: WOMENS IMAGING | Facility: HOSPITAL | Age: 40
End: 2020-10-07

## 2020-10-07 PROCEDURE — 77063 BREAST TOMOSYNTHESIS BI: CPT | Performed by: RADIOLOGY

## 2020-10-07 PROCEDURE — 77067 SCR MAMMO BI INCL CAD: CPT | Performed by: RADIOLOGY

## 2020-10-30 DIAGNOSIS — K95.89 IRON DEFICIENCY ANEMIA FOLLOWING BARIATRIC SURGERY: Primary | ICD-10-CM

## 2020-10-30 DIAGNOSIS — K91.2 POSTSURGICAL NONABSORPTION: ICD-10-CM

## 2020-10-30 DIAGNOSIS — D50.8 IRON DEFICIENCY ANEMIA SECONDARY TO INADEQUATE DIETARY IRON INTAKE: ICD-10-CM

## 2020-10-30 DIAGNOSIS — D50.8 IRON DEFICIENCY ANEMIA FOLLOWING BARIATRIC SURGERY: Primary | ICD-10-CM

## 2020-11-02 ENCOUNTER — TELEPHONE (OUTPATIENT)
Dept: ONCOLOGY | Facility: CLINIC | Age: 40
End: 2020-11-02

## 2020-11-04 ENCOUNTER — TELEPHONE (OUTPATIENT)
Dept: ONCOLOGY | Facility: CLINIC | Age: 40
End: 2020-11-04

## 2020-11-04 NOTE — TELEPHONE ENCOUNTER
PER STAFF MSG Yenifer ALONSO- PT NEEDS TO SEEN NP PRIOR TO INJECTAFER - CALLED PT AND MOVED APPT UP TO 12:30

## 2020-11-05 ENCOUNTER — DOCUMENTATION (OUTPATIENT)
Dept: ONCOLOGY | Facility: CLINIC | Age: 40
End: 2020-11-05

## 2020-11-05 NOTE — PROGRESS NOTES
"I have been notified by [facility] Pharmacist that pt is receiving Injectafer. She has commercial insurance and can use the copay card. I have enrolled pt and sent all billing information to Madie Hurtado for processing.     Under the Injectafer Savings Program, eligible insured patients may pay no more than $50 for Injectafer for the first dose and $0 for Injectafer for the second dose, up to a maximum savings limit of $500 per dose, a $1,000 program limit per course of therapy.\"The offer is valid for 2 courses, or 4 doses, of an Injectafer prescription. One enrollment is allowed per 12-month period.    "

## 2020-11-06 ENCOUNTER — APPOINTMENT (OUTPATIENT)
Dept: LAB | Facility: HOSPITAL | Age: 40
End: 2020-11-06

## 2020-11-06 ENCOUNTER — OFFICE VISIT (OUTPATIENT)
Dept: ONCOLOGY | Facility: CLINIC | Age: 40
End: 2020-11-06

## 2020-11-06 ENCOUNTER — LAB (OUTPATIENT)
Dept: LAB | Facility: HOSPITAL | Age: 40
End: 2020-11-06

## 2020-11-06 ENCOUNTER — INFUSION (OUTPATIENT)
Dept: ONCOLOGY | Facility: HOSPITAL | Age: 40
End: 2020-11-06

## 2020-11-06 VITALS
TEMPERATURE: 98 F | HEART RATE: 71 BPM | BODY MASS INDEX: 36.73 KG/M2 | SYSTOLIC BLOOD PRESSURE: 117 MMHG | WEIGHT: 234 LBS | RESPIRATION RATE: 16 BRPM | DIASTOLIC BLOOD PRESSURE: 77 MMHG | HEIGHT: 67 IN | OXYGEN SATURATION: 100 %

## 2020-11-06 VITALS — HEART RATE: 64 BPM | DIASTOLIC BLOOD PRESSURE: 78 MMHG | SYSTOLIC BLOOD PRESSURE: 128 MMHG

## 2020-11-06 DIAGNOSIS — K91.2 POSTSURGICAL NONABSORPTION: ICD-10-CM

## 2020-11-06 DIAGNOSIS — K95.89 IRON DEFICIENCY ANEMIA FOLLOWING BARIATRIC SURGERY: ICD-10-CM

## 2020-11-06 DIAGNOSIS — D50.8 IRON DEFICIENCY ANEMIA SECONDARY TO INADEQUATE DIETARY IRON INTAKE: ICD-10-CM

## 2020-11-06 DIAGNOSIS — D50.8 IRON DEFICIENCY ANEMIA SECONDARY TO INADEQUATE DIETARY IRON INTAKE: Primary | ICD-10-CM

## 2020-11-06 DIAGNOSIS — D50.8 IRON DEFICIENCY ANEMIA FOLLOWING BARIATRIC SURGERY: Primary | ICD-10-CM

## 2020-11-06 DIAGNOSIS — D50.8 IRON DEFICIENCY ANEMIA FOLLOWING BARIATRIC SURGERY: ICD-10-CM

## 2020-11-06 DIAGNOSIS — K95.89 IRON DEFICIENCY ANEMIA FOLLOWING BARIATRIC SURGERY: Primary | ICD-10-CM

## 2020-11-06 LAB
BASOPHILS # BLD AUTO: 0.03 10*3/MM3 (ref 0–0.2)
BASOPHILS NFR BLD AUTO: 0.5 % (ref 0–1.5)
DEPRECATED RDW RBC AUTO: 40.7 FL (ref 37–54)
EOSINOPHIL # BLD AUTO: 0.13 10*3/MM3 (ref 0–0.4)
EOSINOPHIL NFR BLD AUTO: 2.2 % (ref 0.3–6.2)
ERYTHROCYTE [DISTWIDTH] IN BLOOD BY AUTOMATED COUNT: 12.1 % (ref 12.3–15.4)
HCT VFR BLD AUTO: 39 % (ref 34–46.6)
HGB BLD-MCNC: 12.6 G/DL (ref 12–15.9)
IMM GRANULOCYTES # BLD AUTO: 0.01 10*3/MM3 (ref 0–0.05)
IMM GRANULOCYTES NFR BLD AUTO: 0.2 % (ref 0–0.5)
IRON 24H UR-MRATE: 51 MCG/DL (ref 37–145)
IRON SATN MFR SERPL: 13 % (ref 14–48)
LYMPHOCYTES # BLD AUTO: 1.53 10*3/MM3 (ref 0.7–3.1)
LYMPHOCYTES NFR BLD AUTO: 25.8 % (ref 19.6–45.3)
MCH RBC QN AUTO: 29.6 PG (ref 26.6–33)
MCHC RBC AUTO-ENTMCNC: 32.3 G/DL (ref 31.5–35.7)
MCV RBC AUTO: 91.8 FL (ref 79–97)
MONOCYTES # BLD AUTO: 0.38 10*3/MM3 (ref 0.1–0.9)
MONOCYTES NFR BLD AUTO: 6.4 % (ref 5–12)
NEUTROPHILS NFR BLD AUTO: 3.85 10*3/MM3 (ref 1.7–7)
NEUTROPHILS NFR BLD AUTO: 64.9 % (ref 42.7–76)
NRBC BLD AUTO-RTO: 0 /100 WBC (ref 0–0.2)
PLATELET # BLD AUTO: 295 10*3/MM3 (ref 140–450)
PMV BLD AUTO: 10.8 FL (ref 6–12)
RBC # BLD AUTO: 4.25 10*6/MM3 (ref 3.77–5.28)
TIBC SERPL-MCNC: 400 MCG/DL (ref 249–505)
TRANSFERRIN SERPL-MCNC: 286 MG/DL (ref 200–360)
WBC # BLD AUTO: 5.93 10*3/MM3 (ref 3.4–10.8)

## 2020-11-06 PROCEDURE — 63710000001 PROCHLORPERAZINE MALEATE PER 10 MG: Performed by: NURSE PRACTITIONER

## 2020-11-06 PROCEDURE — 96374 THER/PROPH/DIAG INJ IV PUSH: CPT

## 2020-11-06 PROCEDURE — 83540 ASSAY OF IRON: CPT

## 2020-11-06 PROCEDURE — 36415 COLL VENOUS BLD VENIPUNCTURE: CPT

## 2020-11-06 PROCEDURE — 25010000002 FERRIC CARBOXYMALTOSE 750 MG/15ML SOLUTION 15 ML VIAL: Performed by: NURSE PRACTITIONER

## 2020-11-06 PROCEDURE — 85025 COMPLETE CBC W/AUTO DIFF WBC: CPT | Performed by: NURSE PRACTITIONER

## 2020-11-06 PROCEDURE — 99213 OFFICE O/P EST LOW 20 MIN: CPT | Performed by: NURSE PRACTITIONER

## 2020-11-06 PROCEDURE — 84466 ASSAY OF TRANSFERRIN: CPT

## 2020-11-06 RX ORDER — PROCHLORPERAZINE MALEATE 10 MG
10 TABLET ORAL EVERY 6 HOURS PRN
Status: DISCONTINUED | OUTPATIENT
Start: 2020-11-06 | End: 2020-11-06 | Stop reason: HOSPADM

## 2020-11-06 RX ORDER — LEVOTHYROXINE SODIUM 0.05 MG/1
50 TABLET ORAL DAILY
COMMUNITY

## 2020-11-06 RX ORDER — PROCHLORPERAZINE MALEATE 10 MG
10 TABLET ORAL EVERY 6 HOURS PRN
Status: CANCELLED
Start: 2020-11-06

## 2020-11-06 RX ORDER — SODIUM CHLORIDE 9 MG/ML
250 INJECTION, SOLUTION INTRAVENOUS ONCE
Status: COMPLETED | OUTPATIENT
Start: 2020-11-06 | End: 2020-11-06

## 2020-11-06 RX ORDER — SODIUM CHLORIDE 9 MG/ML
250 INJECTION, SOLUTION INTRAVENOUS ONCE
Status: CANCELLED | OUTPATIENT
Start: 2020-11-06

## 2020-11-06 RX ADMIN — SODIUM CHLORIDE 250 ML: 9 INJECTION, SOLUTION INTRAVENOUS at 13:23

## 2020-11-06 RX ADMIN — FERRIC CARBOXYMALTOSE INJECTION 750 MG: 50 INJECTION, SOLUTION INTRAVENOUS at 13:29

## 2020-11-06 RX ADMIN — PROCHLORPERAZINE MALEATE 10 MG: 10 TABLET ORAL at 13:23

## 2020-11-06 NOTE — PROGRESS NOTES
"  Subjective   Iron deficiency in the setting of previous gastric bypass surgery requiring IV iron therapy.       HISTORY OF PRESENT ILLNESS: Patient is a 40-year-old female with a history of iron deficiency anemia.  She had gastric bypass surgery in 2010, and is required multiple doses of IV iron in the past.  The last time she had iron was November 2018.    Patient presents today with complaints of recurrent fatigue.  She was recently seen at her gynecologist office who checked labs.  Her hemoglobin was 12, but her ferritin was only 10.  Patient states that she feels a little off.  She denies being dizzy or lightheaded.  She denies pica.  She denies bleeding issues.    History of Present Illness      Past Medical History, Past Surgical History, Social History, Family History have been reviewed and are without significant changes except as mentioned.    Review of Systems   Constitutional: Positive for fatigue. Negative for activity change, appetite change, chills and fever.   HENT: Negative for mouth sores, nosebleeds and trouble swallowing.    Respiratory: Negative for cough and shortness of breath.    Cardiovascular: Negative for chest pain and leg swelling.   Gastrointestinal: Negative for abdominal pain, constipation, diarrhea, nausea and vomiting.   Genitourinary: Negative for difficulty urinating.   Skin: Negative for rash.   Neurological: Negative for dizziness, weakness and numbness.   Hematological: Negative for adenopathy. Does not bruise/bleed easily.   Psychiatric/Behavioral: Negative for sleep disturbance.        Medications:  The current medication list was reviewed in the EMR    ALLERGIES:    Allergies   Allergen Reactions   • Eletriptan    • Sumatriptan Palpitations       Objective      Vitals:    11/06/20 1251   BP: 117/77   Pulse: 71   Resp: 16   Temp: 98 °F (36.7 °C)   TempSrc: Temporal   SpO2: 100%   Weight: 106 kg (234 lb)   Height: 170.2 cm (67\")  Comment: states   PainSc: 0-No pain "     Current Status 11/6/2020   ECOG score 0       Physical Exam   Constitutional: She is oriented to person, place, and time. She appears well-developed. No distress.   HENT:   Head: Normocephalic and atraumatic.   Mouth/Throat: No oropharyngeal exudate.   Eyes: Pupils are equal, round, and reactive to light.   Neck: Normal range of motion.   Cardiovascular: Normal rate, regular rhythm and normal heart sounds.   No murmur heard.  Pulmonary/Chest: Effort normal and breath sounds normal. No respiratory distress. She has no wheezes. She has no rhonchi. She has no rales.   Abdominal: Soft. Normal appearance and bowel sounds are normal. She exhibits no distension.   Musculoskeletal: Normal range of motion.   Neurological: She is alert and oriented to person, place, and time.   Skin: Skin is warm and dry. No rash noted.   Vitals reviewed.        Patient screened NEGATIVE for depression based on a PHQ-9 score of 0 on 11/6/2020.       RECENT LABS:  Hematology WBC   Date Value Ref Range Status   11/06/2020 5.93 3.40 - 10.80 10*3/mm3 Final     RBC   Date Value Ref Range Status   11/06/2020 4.25 3.77 - 5.28 10*6/mm3 Final     Hemoglobin   Date Value Ref Range Status   11/06/2020 12.6 12.0 - 15.9 g/dL Final     Hematocrit   Date Value Ref Range Status   11/06/2020 39.0 34.0 - 46.6 % Final     Platelets   Date Value Ref Range Status   11/06/2020 295 140 - 450 10*3/mm3 Final            Lab Results   Component Value Date    IRON 51 11/06/2020    TIBC 400 11/06/2020    FERRITIN 81.21 01/19/2018   IRON SATURATION 13%    Ferritin checked at outside lab 10/29: 10.    Assessment/Plan   1.  Recurring iron deficiency in a patient with previous gastric bypass surgery.  Has required IV iron infusions multiple times  · Patient returns 11/6/2020, again iron deficient with a ferritin of 10 iron saturation 13%.  We will proceed with IV Injectafer x2 doses.  Currently, it appears the patient is requiring IV iron about every 24  months.        PLAN:  1. Proceed with IV Injectafer today.  2. Return in 1 week for her second dose of IV Injectafer.  3. Return in 1 year for follow-up visit with Dr. Buenrostro with repeat labs at that time, sooner should she develop any new concerns or problems.          Cora Bernal, APRN        11/6/2020      CC:

## 2020-11-13 ENCOUNTER — INFUSION (OUTPATIENT)
Dept: ONCOLOGY | Facility: HOSPITAL | Age: 40
End: 2020-11-13

## 2020-11-13 VITALS
BODY MASS INDEX: 36.65 KG/M2 | SYSTOLIC BLOOD PRESSURE: 128 MMHG | WEIGHT: 234 LBS | HEART RATE: 76 BPM | DIASTOLIC BLOOD PRESSURE: 78 MMHG

## 2020-11-13 DIAGNOSIS — K91.2 POSTSURGICAL NONABSORPTION: ICD-10-CM

## 2020-11-13 DIAGNOSIS — D50.8 IRON DEFICIENCY ANEMIA SECONDARY TO INADEQUATE DIETARY IRON INTAKE: Primary | ICD-10-CM

## 2020-11-13 DIAGNOSIS — K95.89 IRON DEFICIENCY ANEMIA FOLLOWING BARIATRIC SURGERY: ICD-10-CM

## 2020-11-13 DIAGNOSIS — D50.8 IRON DEFICIENCY ANEMIA FOLLOWING BARIATRIC SURGERY: ICD-10-CM

## 2020-11-13 PROCEDURE — 25010000002 FERRIC CARBOXYMALTOSE 750 MG/15ML SOLUTION 15 ML VIAL: Performed by: INTERNAL MEDICINE

## 2020-11-13 PROCEDURE — 63710000001 PROCHLORPERAZINE MALEATE PER 10 MG: Performed by: INTERNAL MEDICINE

## 2020-11-13 PROCEDURE — 96374 THER/PROPH/DIAG INJ IV PUSH: CPT

## 2020-11-13 RX ORDER — SODIUM CHLORIDE 9 MG/ML
250 INJECTION, SOLUTION INTRAVENOUS ONCE
Status: COMPLETED | OUTPATIENT
Start: 2020-11-13 | End: 2020-11-13

## 2020-11-13 RX ORDER — PROCHLORPERAZINE MALEATE 10 MG
10 TABLET ORAL EVERY 6 HOURS PRN
Status: DISCONTINUED | OUTPATIENT
Start: 2020-11-13 | End: 2020-11-13 | Stop reason: HOSPADM

## 2020-11-13 RX ADMIN — FERRIC CARBOXYMALTOSE INJECTION 750 MG: 50 INJECTION, SOLUTION INTRAVENOUS at 13:15

## 2020-11-13 RX ADMIN — PROCHLORPERAZINE MALEATE 10 MG: 10 TABLET ORAL at 13:14

## 2020-11-13 RX ADMIN — SODIUM CHLORIDE 250 ML: 9 INJECTION, SOLUTION INTRAVENOUS at 13:14

## 2021-02-25 ENCOUNTER — TRANSCRIBE ORDERS (OUTPATIENT)
Dept: ADMINISTRATIVE | Facility: HOSPITAL | Age: 41
End: 2021-02-25

## 2021-02-25 ENCOUNTER — LAB (OUTPATIENT)
Dept: LAB | Facility: HOSPITAL | Age: 41
End: 2021-02-25

## 2021-02-25 DIAGNOSIS — Z32.00 PREGNANCY EXAMINATION OR TEST, PREGNANCY UNCONFIRMED: Primary | ICD-10-CM

## 2021-02-25 LAB — HCG INTACT+B SERPL-ACNC: <0.5 MIU/ML

## 2021-02-25 PROCEDURE — 84702 CHORIONIC GONADOTROPIN TEST: CPT | Performed by: OBSTETRICS & GYNECOLOGY

## 2021-02-25 PROCEDURE — 36415 COLL VENOUS BLD VENIPUNCTURE: CPT | Performed by: OBSTETRICS & GYNECOLOGY

## 2021-04-02 ENCOUNTER — BULK ORDERING (OUTPATIENT)
Dept: CASE MANAGEMENT | Facility: OTHER | Age: 41
End: 2021-04-02

## 2021-04-02 DIAGNOSIS — Z23 IMMUNIZATION DUE: ICD-10-CM

## 2021-09-30 LAB
EXTERNAL HEPATITIS B SURFACE ANTIGEN: NEGATIVE
EXTERNAL HEPATITIS C AB: NORMAL
EXTERNAL RUBELLA QUALITATIVE: NORMAL
EXTERNAL SYPHILIS RPR SCREEN: NORMAL
HIV1 P24 AG SERPL QL IA: NORMAL

## 2021-11-04 ENCOUNTER — LAB (OUTPATIENT)
Dept: LAB | Facility: HOSPITAL | Age: 41
End: 2021-11-04

## 2021-11-04 ENCOUNTER — OFFICE VISIT (OUTPATIENT)
Dept: ONCOLOGY | Facility: CLINIC | Age: 41
End: 2021-11-04

## 2021-11-04 VITALS
SYSTOLIC BLOOD PRESSURE: 116 MMHG | BODY MASS INDEX: 37.34 KG/M2 | RESPIRATION RATE: 14 BRPM | OXYGEN SATURATION: 100 % | TEMPERATURE: 97.5 F | HEART RATE: 71 BPM | HEIGHT: 67 IN | WEIGHT: 237.9 LBS | DIASTOLIC BLOOD PRESSURE: 71 MMHG

## 2021-11-04 DIAGNOSIS — K95.89 IRON DEFICIENCY ANEMIA FOLLOWING BARIATRIC SURGERY: ICD-10-CM

## 2021-11-04 DIAGNOSIS — D50.8 IRON DEFICIENCY ANEMIA SECONDARY TO INADEQUATE DIETARY IRON INTAKE: Primary | ICD-10-CM

## 2021-11-04 DIAGNOSIS — D50.8 IRON DEFICIENCY ANEMIA FOLLOWING BARIATRIC SURGERY: ICD-10-CM

## 2021-11-04 DIAGNOSIS — K91.2 POSTSURGICAL NONABSORPTION: ICD-10-CM

## 2021-11-04 LAB
BASOPHILS # BLD AUTO: 0.02 10*3/MM3 (ref 0–0.2)
BASOPHILS NFR BLD AUTO: 0.3 % (ref 0–1.5)
DEPRECATED RDW RBC AUTO: 41.2 FL (ref 37–54)
EOSINOPHIL # BLD AUTO: 0.08 10*3/MM3 (ref 0–0.4)
EOSINOPHIL NFR BLD AUTO: 1.3 % (ref 0.3–6.2)
ERYTHROCYTE [DISTWIDTH] IN BLOOD BY AUTOMATED COUNT: 12.6 % (ref 12.3–15.4)
FERRITIN SERPL-MCNC: 127.8 NG/ML (ref 11–207)
HCT VFR BLD AUTO: 36.6 % (ref 34–46.6)
HGB BLD-MCNC: 12.5 G/DL (ref 12–15.9)
IMM GRANULOCYTES # BLD AUTO: 0.04 10*3/MM3 (ref 0–0.05)
IMM GRANULOCYTES NFR BLD AUTO: 0.6 % (ref 0–0.5)
IRON 24H UR-MRATE: 75 MCG/DL (ref 37–145)
IRON SATN MFR SERPL: 23 % (ref 14–48)
LYMPHOCYTES # BLD AUTO: 1.46 10*3/MM3 (ref 0.7–3.1)
LYMPHOCYTES NFR BLD AUTO: 23.5 % (ref 19.6–45.3)
MCH RBC QN AUTO: 30.5 PG (ref 26.6–33)
MCHC RBC AUTO-ENTMCNC: 34.2 G/DL (ref 31.5–35.7)
MCV RBC AUTO: 89.3 FL (ref 79–97)
MONOCYTES # BLD AUTO: 0.35 10*3/MM3 (ref 0.1–0.9)
MONOCYTES NFR BLD AUTO: 5.6 % (ref 5–12)
NEUTROPHILS NFR BLD AUTO: 4.26 10*3/MM3 (ref 1.7–7)
NEUTROPHILS NFR BLD AUTO: 68.7 % (ref 42.7–76)
NRBC BLD AUTO-RTO: 0 /100 WBC (ref 0–0.2)
PLATELET # BLD AUTO: 262 10*3/MM3 (ref 140–450)
PMV BLD AUTO: 9.9 FL (ref 6–12)
RBC # BLD AUTO: 4.1 10*6/MM3 (ref 3.77–5.28)
TIBC SERPL-MCNC: 321 MCG/DL (ref 249–505)
TRANSFERRIN SERPL-MCNC: 229 MG/DL (ref 200–360)
WBC # BLD AUTO: 6.21 10*3/MM3 (ref 3.4–10.8)

## 2021-11-04 PROCEDURE — 84466 ASSAY OF TRANSFERRIN: CPT

## 2021-11-04 PROCEDURE — 83540 ASSAY OF IRON: CPT

## 2021-11-04 PROCEDURE — 82728 ASSAY OF FERRITIN: CPT

## 2021-11-04 PROCEDURE — 36415 COLL VENOUS BLD VENIPUNCTURE: CPT

## 2021-11-04 PROCEDURE — 99213 OFFICE O/P EST LOW 20 MIN: CPT | Performed by: INTERNAL MEDICINE

## 2021-11-04 PROCEDURE — 85025 COMPLETE CBC W/AUTO DIFF WBC: CPT

## 2021-11-04 RX ORDER — PROGESTERONE 100 MG/1
100 INSERT VAGINAL 3 TIMES DAILY
COMMUNITY
Start: 2021-08-12 | End: 2021-11-04

## 2021-11-04 RX ORDER — PROGESTERONE 200 MG/1
200 CAPSULE ORAL
COMMUNITY
Start: 2021-08-31 | End: 2021-11-04 | Stop reason: SDUPTHER

## 2021-11-04 NOTE — PROGRESS NOTES
Subjective    REASON FOR FOLLOW UP:  Iron deficiency in the setting of previous gastric bypass surgery requiring IV iron therapy.       HISTORY OF PRESENT ILLNESS: Patient is a 41 y.o. female with a history of iron deficiency anemia.  She had gastric bypass surgery in 2010, and has required multiple doses of IV iron in the past.  The last time she had iron was November 2020 when she received 2 doses of IV Injectafer 750 mg each on 11/6/2020 and 11/13/2020.    She is here for her annual follow-up and reports that she is now 16 weeks pregnant with her second daughter.  Her hemoglobin today appears stable but her iron studies are pending.  During her pregnancy will need to check her iron status more frequently.    History of Present Illness      Past Medical History, Past Surgical History, Social History, Family History have been reviewed and are without significant changes except as mentioned.    Review of Systems   Constitutional: Positive for fatigue. Negative for activity change, appetite change, chills and fever.   HENT: Negative for mouth sores, nosebleeds and trouble swallowing.    Respiratory: Negative for cough and shortness of breath.    Cardiovascular: Negative for chest pain and leg swelling.   Gastrointestinal: Negative for abdominal pain, constipation, diarrhea, nausea and vomiting.   Genitourinary: Negative for difficulty urinating.   Skin: Negative for rash.   Neurological: Negative for dizziness, weakness and numbness.   Hematological: Negative for adenopathy. Does not bruise/bleed easily.   Psychiatric/Behavioral: Negative for sleep disturbance.        Medications:  The current medication list was reviewed in the EMR    ALLERGIES:    Allergies   Allergen Reactions   • Eletriptan Palpitations   • Sumatriptan Palpitations       Objective      Vitals:    11/04/21 1546   BP: 116/71   Pulse: 71   Resp: 14   Temp: 97.5 °F (36.4 °C)   TempSrc: Infrared   SpO2: 100%   Weight: 108 kg (237 lb 14.4 oz)  "  Height: 170.5 cm (67.13\")  Comment: new ht   PainSc: 0-No pain     Current Status 11/4/2021   ECOG score 0       Physical Exam   Constitutional: She is oriented to person, place, and time. She appears well-developed. No distress.   HENT:   Head: Normocephalic and atraumatic.   Mouth/Throat: No oropharyngeal exudate.   Eyes: Pupils are equal, round, and reactive to light.   Cardiovascular: Normal rate, regular rhythm and normal heart sounds.   No murmur heard.  Pulmonary/Chest: Effort normal and breath sounds normal. No respiratory distress. She has no wheezes. She has no rhonchi. She has no rales.   Abdominal: Soft. Normal appearance and bowel sounds are normal. She exhibits no distension.   Musculoskeletal: Normal range of motion.   Neurological: She is alert and oriented to person, place, and time.   Skin: Skin is warm and dry. No rash noted.   Vitals reviewed.  I have reexamined the patient and the results are consistent with the previously documented exam. Rogelio Buenrostro MD         RECENT LABS:  Hematology WBC   Date Value Ref Range Status   11/04/2021 6.21 3.40 - 10.80 10*3/mm3 Final     RBC   Date Value Ref Range Status   11/04/2021 4.10 3.77 - 5.28 10*6/mm3 Final     Hemoglobin   Date Value Ref Range Status   11/04/2021 12.5 12.0 - 15.9 g/dL Final     Hematocrit   Date Value Ref Range Status   11/04/2021 36.6 34.0 - 46.6 % Final     Platelets   Date Value Ref Range Status   11/04/2021 262 140 - 450 10*3/mm3 Final            Lab Results   Component Value Date    IRON 51 11/06/2020    TIBC 400 11/06/2020    FERRITIN 81.21 01/19/2018   IRON SATURATION 13%        Assessment/Plan   1.  Recurring iron deficiency in a patient with previous gastric bypass surgery.  Has required IV iron infusions multiple times  Patient received IV Injectafer x2 doses about this time last year..   2.  She is currently pregnant with her second child therefore we will need to monitor her iron little more closely.      PLAN:  1. We " will review the results of her iron panel and ferritin from today when they become available and contact her if she needs any additional IV iron replacement.  2. Otherwise we will plan to see her again in 3 months with repeat CBC iron panel ferritin B12 and folate levels.  3. Her due date is around Easter.          Rogelio Buenrostro MD        11/4/2021      CC:

## 2022-01-31 ENCOUNTER — HOSPITAL ENCOUNTER (OUTPATIENT)
Dept: INFUSION THERAPY | Facility: HOSPITAL | Age: 42
Discharge: HOME | End: 2022-01-31
Admitting: NURSE PRACTITIONER

## 2022-01-31 ENCOUNTER — TRANSCRIBE ORDERS (OUTPATIENT)
Dept: ADMINISTRATIVE | Facility: HOSPITAL | Age: 42
End: 2022-01-31

## 2022-01-31 VITALS
RESPIRATION RATE: 18 BRPM | OXYGEN SATURATION: 99 % | HEART RATE: 80 BPM | DIASTOLIC BLOOD PRESSURE: 64 MMHG | TEMPERATURE: 98.6 F | SYSTOLIC BLOOD PRESSURE: 110 MMHG

## 2022-01-31 DIAGNOSIS — U07.1 COVID-19 VIRUS DETECTED: Primary | ICD-10-CM

## 2022-01-31 DIAGNOSIS — U07.1 CLINICAL DIAGNOSIS OF SEVERE ACUTE RESPIRATORY SYNDROME CORONAVIRUS 2 (SARS-COV-2) DISEASE: Primary | ICD-10-CM

## 2022-01-31 PROCEDURE — 25010000002 SOTROVIMAB 500 MG/8ML SOLUTION: Performed by: NURSE PRACTITIONER

## 2022-01-31 PROCEDURE — 96365 THER/PROPH/DIAG IV INF INIT: CPT

## 2022-01-31 PROCEDURE — M0247 HC INTRAVENOUS INFUSION SOTROVIMAB: HCPCS | Performed by: NURSE PRACTITIONER

## 2022-01-31 RX ORDER — DIPHENHYDRAMINE HYDROCHLORIDE 50 MG/ML
50 INJECTION INTRAMUSCULAR; INTRAVENOUS ONCE AS NEEDED
Status: CANCELLED | OUTPATIENT
Start: 2022-01-31

## 2022-01-31 RX ORDER — DIPHENHYDRAMINE HYDROCHLORIDE 50 MG/ML
50 INJECTION INTRAMUSCULAR; INTRAVENOUS ONCE AS NEEDED
OUTPATIENT
Start: 2022-01-31

## 2022-01-31 RX ORDER — SODIUM CHLORIDE 9 MG/ML
30 INJECTION, SOLUTION INTRAVENOUS ONCE
Status: COMPLETED | OUTPATIENT
Start: 2022-01-31 | End: 2022-01-31

## 2022-01-31 RX ORDER — DIPHENHYDRAMINE HCL 25 MG
50 CAPSULE ORAL ONCE AS NEEDED
OUTPATIENT
Start: 2022-01-31

## 2022-01-31 RX ORDER — EPINEPHRINE 1 MG/ML
0.3 INJECTION, SOLUTION, CONCENTRATE INTRAVENOUS AS NEEDED
Status: CANCELLED | OUTPATIENT
Start: 2022-01-31

## 2022-01-31 RX ORDER — DIPHENHYDRAMINE HYDROCHLORIDE 50 MG/ML
50 INJECTION INTRAMUSCULAR; INTRAVENOUS ONCE AS NEEDED
Status: DISCONTINUED | OUTPATIENT
Start: 2022-01-31 | End: 2022-02-03 | Stop reason: HOSPADM

## 2022-01-31 RX ORDER — DIPHENHYDRAMINE HCL 25 MG
50 CAPSULE ORAL ONCE AS NEEDED
Status: DISCONTINUED | OUTPATIENT
Start: 2022-01-31 | End: 2022-02-03 | Stop reason: HOSPADM

## 2022-01-31 RX ORDER — SODIUM CHLORIDE 9 MG/ML
30 INJECTION, SOLUTION INTRAVENOUS ONCE
Status: CANCELLED | OUTPATIENT
Start: 2022-01-31

## 2022-01-31 RX ORDER — METHYLPREDNISOLONE SODIUM SUCCINATE 125 MG/2ML
125 INJECTION, POWDER, LYOPHILIZED, FOR SOLUTION INTRAMUSCULAR; INTRAVENOUS AS NEEDED
Status: DISCONTINUED | OUTPATIENT
Start: 2022-01-31 | End: 2022-02-03 | Stop reason: HOSPADM

## 2022-01-31 RX ORDER — METHYLPREDNISOLONE SODIUM SUCCINATE 125 MG/2ML
125 INJECTION, POWDER, LYOPHILIZED, FOR SOLUTION INTRAMUSCULAR; INTRAVENOUS AS NEEDED
Status: CANCELLED | OUTPATIENT
Start: 2022-01-31

## 2022-01-31 RX ORDER — DIPHENHYDRAMINE HCL 50 MG
50 CAPSULE ORAL ONCE AS NEEDED
Status: CANCELLED | OUTPATIENT
Start: 2022-01-31

## 2022-01-31 RX ORDER — METHYLPREDNISOLONE SODIUM SUCCINATE 125 MG/2ML
125 INJECTION, POWDER, LYOPHILIZED, FOR SOLUTION INTRAMUSCULAR; INTRAVENOUS AS NEEDED
OUTPATIENT
Start: 2022-01-31

## 2022-01-31 RX ADMIN — SODIUM CHLORIDE 500 MG: 9 INJECTION, SOLUTION INTRAVENOUS at 18:13

## 2022-01-31 RX ADMIN — SODIUM CHLORIDE 30 ML/HR: 9 INJECTION, SOLUTION INTRAVENOUS at 18:15

## 2022-02-03 DIAGNOSIS — D50.8 IRON DEFICIENCY ANEMIA SECONDARY TO INADEQUATE DIETARY IRON INTAKE: Primary | ICD-10-CM

## 2022-02-03 DIAGNOSIS — K91.2 POSTSURGICAL NONABSORPTION: ICD-10-CM

## 2022-02-08 ENCOUNTER — TELEPHONE (OUTPATIENT)
Dept: ONCOLOGY | Facility: CLINIC | Age: 42
End: 2022-02-08

## 2022-02-08 NOTE — TELEPHONE ENCOUNTER
----- Message from Makenna Albarado RN sent at 2/8/2022  8:23 AM EST -----  Please schedule this pt for a lab appt on 2/9 - Ferritin Iron Profile    She needs extra lab work before she can come in for an infusion, so can you move all her iron infusions out by one week as well    Thanks!

## 2022-02-10 ENCOUNTER — APPOINTMENT (OUTPATIENT)
Dept: ONCOLOGY | Facility: HOSPITAL | Age: 42
End: 2022-02-10

## 2022-02-10 ENCOUNTER — LAB (OUTPATIENT)
Dept: LAB | Facility: HOSPITAL | Age: 42
End: 2022-02-10

## 2022-02-10 DIAGNOSIS — K95.89 IRON DEFICIENCY ANEMIA FOLLOWING BARIATRIC SURGERY: ICD-10-CM

## 2022-02-10 DIAGNOSIS — D50.8 IRON DEFICIENCY ANEMIA SECONDARY TO INADEQUATE DIETARY IRON INTAKE: ICD-10-CM

## 2022-02-10 DIAGNOSIS — K91.2 POSTSURGICAL NONABSORPTION: ICD-10-CM

## 2022-02-10 DIAGNOSIS — D50.8 IRON DEFICIENCY ANEMIA FOLLOWING BARIATRIC SURGERY: ICD-10-CM

## 2022-02-10 LAB
BASOPHILS # BLD AUTO: 0.03 10*3/MM3 (ref 0–0.2)
BASOPHILS NFR BLD AUTO: 0.3 % (ref 0–1.5)
DEPRECATED RDW RBC AUTO: 45.1 FL (ref 37–54)
EOSINOPHIL # BLD AUTO: 0.07 10*3/MM3 (ref 0–0.4)
EOSINOPHIL NFR BLD AUTO: 0.8 % (ref 0.3–6.2)
ERYTHROCYTE [DISTWIDTH] IN BLOOD BY AUTOMATED COUNT: 13.2 % (ref 12.3–15.4)
FERRITIN SERPL-MCNC: 30.3 NG/ML (ref 11–207)
FOLATE SERPL-MCNC: >20 NG/ML (ref 4.78–24.2)
HCT VFR BLD AUTO: 38 % (ref 34–46.6)
HGB BLD-MCNC: 12.8 G/DL (ref 12–15.9)
IMM GRANULOCYTES # BLD AUTO: 0.3 10*3/MM3 (ref 0–0.05)
IMM GRANULOCYTES NFR BLD AUTO: 3.5 % (ref 0–0.5)
IRON 24H UR-MRATE: 107 MCG/DL (ref 37–145)
IRON SATN MFR SERPL: 20 % (ref 14–48)
LYMPHOCYTES # BLD AUTO: 1.3 10*3/MM3 (ref 0.7–3.1)
LYMPHOCYTES NFR BLD AUTO: 15.1 % (ref 19.6–45.3)
MCH RBC QN AUTO: 31.9 PG (ref 26.6–33)
MCHC RBC AUTO-ENTMCNC: 33.7 G/DL (ref 31.5–35.7)
MCV RBC AUTO: 94.8 FL (ref 79–97)
MONOCYTES # BLD AUTO: 0.44 10*3/MM3 (ref 0.1–0.9)
MONOCYTES NFR BLD AUTO: 5.1 % (ref 5–12)
NEUTROPHILS NFR BLD AUTO: 6.46 10*3/MM3 (ref 1.7–7)
NEUTROPHILS NFR BLD AUTO: 75.2 % (ref 42.7–76)
NRBC BLD AUTO-RTO: 0 /100 WBC (ref 0–0.2)
PLATELET # BLD AUTO: 246 10*3/MM3 (ref 140–450)
PMV BLD AUTO: 10 FL (ref 6–12)
RBC # BLD AUTO: 4.01 10*6/MM3 (ref 3.77–5.28)
TIBC SERPL-MCNC: 522 MCG/DL (ref 249–505)
TRANSFERRIN SERPL-MCNC: 373 MG/DL (ref 200–360)
VIT B12 BLD-MCNC: 311 PG/ML (ref 211–946)
WBC NRBC COR # BLD: 8.6 10*3/MM3 (ref 3.4–10.8)

## 2022-02-10 PROCEDURE — 82746 ASSAY OF FOLIC ACID SERUM: CPT | Performed by: INTERNAL MEDICINE

## 2022-02-10 PROCEDURE — 82607 VITAMIN B-12: CPT | Performed by: INTERNAL MEDICINE

## 2022-02-10 PROCEDURE — 83540 ASSAY OF IRON: CPT

## 2022-02-10 PROCEDURE — 85025 COMPLETE CBC W/AUTO DIFF WBC: CPT

## 2022-02-10 PROCEDURE — 84466 ASSAY OF TRANSFERRIN: CPT

## 2022-02-10 PROCEDURE — 36415 COLL VENOUS BLD VENIPUNCTURE: CPT

## 2022-02-10 PROCEDURE — 82728 ASSAY OF FERRITIN: CPT

## 2022-02-17 ENCOUNTER — APPOINTMENT (OUTPATIENT)
Dept: ONCOLOGY | Facility: HOSPITAL | Age: 42
End: 2022-02-17

## 2022-02-17 ENCOUNTER — APPOINTMENT (OUTPATIENT)
Dept: LAB | Facility: HOSPITAL | Age: 42
End: 2022-02-17

## 2022-02-17 ENCOUNTER — OFFICE VISIT (OUTPATIENT)
Dept: ONCOLOGY | Facility: CLINIC | Age: 42
End: 2022-02-17

## 2022-02-17 VITALS
HEART RATE: 102 BPM | RESPIRATION RATE: 16 BRPM | SYSTOLIC BLOOD PRESSURE: 151 MMHG | WEIGHT: 248.4 LBS | BODY MASS INDEX: 38.99 KG/M2 | TEMPERATURE: 97.8 F | HEIGHT: 67 IN | DIASTOLIC BLOOD PRESSURE: 81 MMHG | OXYGEN SATURATION: 100 %

## 2022-02-17 DIAGNOSIS — D50.8 IRON DEFICIENCY ANEMIA FOLLOWING BARIATRIC SURGERY: Primary | ICD-10-CM

## 2022-02-17 DIAGNOSIS — K91.2 POSTSURGICAL NONABSORPTION: ICD-10-CM

## 2022-02-17 DIAGNOSIS — K95.89 IRON DEFICIENCY ANEMIA FOLLOWING BARIATRIC SURGERY: Primary | ICD-10-CM

## 2022-02-17 DIAGNOSIS — Z3A.29 29 WEEKS GESTATION OF PREGNANCY: ICD-10-CM

## 2022-02-17 PROCEDURE — 99213 OFFICE O/P EST LOW 20 MIN: CPT | Performed by: INTERNAL MEDICINE

## 2022-02-17 RX ORDER — LANCETS
1 EACH MISCELLANEOUS 4 TIMES DAILY
COMMUNITY
Start: 2022-01-17

## 2022-02-17 RX ORDER — BLOOD SUGAR DIAGNOSTIC
1 STRIP MISCELLANEOUS 4 TIMES DAILY
COMMUNITY
Start: 2022-01-17 | End: 2022-03-24

## 2022-02-17 NOTE — PROGRESS NOTES
Subjective    REASON FOR FOLLOW UP:  Iron deficiency in the setting of previous gastric bypass surgery requiring IV iron therapy.       HISTORY OF PRESENT ILLNESS: Patient is a 41 y.o. female with a history of iron deficiency anemia.  She had gastric bypass surgery in 2010, and has required multiple doses of IV iron in the past.  The last time she had iron was November 2020 when she received 2 doses of IV Injectafer 750 mg each on 11/6/2020 and 11/13/2020.    She is here for her follow-up and has been of iron status.  She is now pregnant with her second daughter.  Her labs performed last week on 2/10/2022 showed a normal hemoglobin of 12.8.  Her iron saturation was 20% with a ferritin of 30.  During her pregnancy will need to check her iron status more frequently.    We also checked her B12 level on 2/10/2022 which was normal with a value of 311.    She tells me that she had a Covid infection a few weeks ago and certainly that may be artificially raising her ferritin level as part of the inflammatory response.  Nevertheless, she does not qualify for IV iron today based on her latest labs from 2/10/2022.    History of Present Illness      Past Medical History, Past Surgical History, Social History, Family History have been reviewed and are without significant changes except as mentioned.    Review of Systems   Constitutional: Positive for fatigue. Negative for activity change, appetite change, chills and fever.   HENT: Negative for mouth sores, nosebleeds and trouble swallowing.    Respiratory: Negative for cough and shortness of breath.    Cardiovascular: Negative for chest pain and leg swelling.   Gastrointestinal: Negative for abdominal pain, constipation, diarrhea, nausea and vomiting.   Genitourinary: Negative for difficulty urinating.   Skin: Negative for rash.   Neurological: Negative for dizziness, weakness and numbness.   Hematological: Negative for adenopathy. Does not bruise/bleed easily.  "  Psychiatric/Behavioral: Negative for sleep disturbance.        Medications:  The current medication list was reviewed in the EMR    ALLERGIES:    Allergies   Allergen Reactions   • Eletriptan Palpitations   • Sumatriptan Palpitations       Objective      Vitals:    02/17/22 1333   BP: 151/81   Pulse: 102   Resp: 16   Temp: 97.8 °F (36.6 °C)   TempSrc: Temporal   SpO2: 100%   Weight: 113 kg (248 lb 6.4 oz)   Height: 170.5 cm (67.13\")   PainSc: 0-No pain     Current Status 11/4/2021   ECOG score 0       Physical Exam   Constitutional: She is oriented to person, place, and time. She appears well-developed. No distress.   HENT:   Head: Normocephalic and atraumatic.   Mouth/Throat: No oropharyngeal exudate.   Eyes: Pupils are equal, round, and reactive to light.   Cardiovascular: Normal rate, regular rhythm and normal heart sounds.   No murmur heard.  Pulmonary/Chest: Effort normal and breath sounds normal. No respiratory distress. She has no wheezes. She has no rhonchi. She has no rales.   Abdominal: Soft. Normal appearance and bowel sounds are normal. She exhibits no distension.   Musculoskeletal: Normal range of motion.   Neurological: She is alert and oriented to person, place, and time.   Skin: Skin is warm and dry. No rash noted.   Vitals reviewed.  I have reexamined the patient and the results are consistent with the previously documented exam. Rogelio Buenrostro MD         RECENT LABS:  Hematology WBC   Date Value Ref Range Status   02/10/2022 8.60 3.40 - 10.80 10*3/mm3 Final     RBC   Date Value Ref Range Status   02/10/2022 4.01 3.77 - 5.28 10*6/mm3 Final     Hemoglobin   Date Value Ref Range Status   02/10/2022 12.8 12.0 - 15.9 g/dL Final     Hematocrit   Date Value Ref Range Status   02/10/2022 38.0 34.0 - 46.6 % Final     Platelets   Date Value Ref Range Status   02/10/2022 246 140 - 450 10*3/mm3 Final            Lab Results   Component Value Date    IRON 107 02/10/2022    TIBC 522 (H) 02/10/2022    FERRITIN " 30.30 02/10/2022   IRON SATURATION 13%        Assessment/Plan   1.  Recurring iron deficiency in a patient with previous gastric bypass surgery.  Has required IV iron infusions multiple times  Patient received IV Injectafer x2 doses in November 2020.   2.  She is currently pregnant with her second child therefore we will need to monitor her iron little more closely.  3.  She had a ferritin level in her GYN office which was 10 however when she had her labs repeated in our office last week on 2/10/2022 her ferritin was 30 and iron saturation was 20.  4.  Recent COVID infection.  I suspect this may be artificially raising her ferritin level and I still suspect that she will require IV iron prior to delivery.      PLAN:  1. We will have her return for labs in 2 weeks including a CBC, iron panel and ferritin.  2. If she qualifies for IV iron we will contact her and schedule her to come in for iron infusions.    3. Her due date is around Easter.  4. We will tentatively schedule MD follow-up in 8 weeks.          Rogelio Buenrostro MD        2/17/2022      CC:

## 2022-02-24 ENCOUNTER — APPOINTMENT (OUTPATIENT)
Dept: ONCOLOGY | Facility: HOSPITAL | Age: 42
End: 2022-02-24

## 2022-02-24 ENCOUNTER — LAB (OUTPATIENT)
Dept: LAB | Facility: HOSPITAL | Age: 42
End: 2022-02-24

## 2022-02-24 DIAGNOSIS — D50.8 IRON DEFICIENCY ANEMIA SECONDARY TO INADEQUATE DIETARY IRON INTAKE: ICD-10-CM

## 2022-02-24 DIAGNOSIS — K95.89 IRON DEFICIENCY ANEMIA FOLLOWING BARIATRIC SURGERY: ICD-10-CM

## 2022-02-24 DIAGNOSIS — K91.2 POSTSURGICAL NONABSORPTION: ICD-10-CM

## 2022-02-24 DIAGNOSIS — D50.8 IRON DEFICIENCY ANEMIA FOLLOWING BARIATRIC SURGERY: ICD-10-CM

## 2022-02-24 LAB
BASOPHILS # BLD AUTO: 0.04 10*3/MM3 (ref 0–0.2)
BASOPHILS NFR BLD AUTO: 0.5 % (ref 0–1.5)
DEPRECATED RDW RBC AUTO: 46.9 FL (ref 37–54)
EOSINOPHIL # BLD AUTO: 0.07 10*3/MM3 (ref 0–0.4)
EOSINOPHIL NFR BLD AUTO: 0.8 % (ref 0.3–6.2)
ERYTHROCYTE [DISTWIDTH] IN BLOOD BY AUTOMATED COUNT: 13.2 % (ref 12.3–15.4)
FERRITIN SERPL-MCNC: 40.3 NG/ML (ref 11–207)
HCT VFR BLD AUTO: 38.9 % (ref 34–46.6)
HGB BLD-MCNC: 12.6 G/DL (ref 12–15.9)
IMM GRANULOCYTES # BLD AUTO: 0.18 10*3/MM3 (ref 0–0.05)
IMM GRANULOCYTES NFR BLD AUTO: 2.1 % (ref 0–0.5)
IRON 24H UR-MRATE: 144 MCG/DL (ref 37–145)
IRON SATN MFR SERPL: 29 % (ref 14–48)
LYMPHOCYTES # BLD AUTO: 1.62 10*3/MM3 (ref 0.7–3.1)
LYMPHOCYTES NFR BLD AUTO: 18.7 % (ref 19.6–45.3)
MCH RBC QN AUTO: 31.3 PG (ref 26.6–33)
MCHC RBC AUTO-ENTMCNC: 32.4 G/DL (ref 31.5–35.7)
MCV RBC AUTO: 96.8 FL (ref 79–97)
MONOCYTES # BLD AUTO: 0.62 10*3/MM3 (ref 0.1–0.9)
MONOCYTES NFR BLD AUTO: 7.1 % (ref 5–12)
NEUTROPHILS NFR BLD AUTO: 6.15 10*3/MM3 (ref 1.7–7)
NEUTROPHILS NFR BLD AUTO: 70.8 % (ref 42.7–76)
NRBC BLD AUTO-RTO: 0 /100 WBC (ref 0–0.2)
PLATELET # BLD AUTO: 239 10*3/MM3 (ref 140–450)
PMV BLD AUTO: 10.8 FL (ref 6–12)
RBC # BLD AUTO: 4.02 10*6/MM3 (ref 3.77–5.28)
TIBC SERPL-MCNC: 494 MCG/DL (ref 249–505)
TRANSFERRIN SERPL-MCNC: 353 MG/DL (ref 200–360)
WBC NRBC COR # BLD: 8.68 10*3/MM3 (ref 3.4–10.8)

## 2022-02-24 PROCEDURE — 36415 COLL VENOUS BLD VENIPUNCTURE: CPT

## 2022-02-24 PROCEDURE — 82728 ASSAY OF FERRITIN: CPT

## 2022-02-24 PROCEDURE — 83540 ASSAY OF IRON: CPT

## 2022-02-24 PROCEDURE — 85025 COMPLETE CBC W/AUTO DIFF WBC: CPT

## 2022-02-24 PROCEDURE — 84466 ASSAY OF TRANSFERRIN: CPT

## 2022-03-03 ENCOUNTER — APPOINTMENT (OUTPATIENT)
Dept: LAB | Facility: HOSPITAL | Age: 42
End: 2022-03-03

## 2022-03-03 ENCOUNTER — APPOINTMENT (OUTPATIENT)
Dept: ONCOLOGY | Facility: HOSPITAL | Age: 42
End: 2022-03-03

## 2022-03-22 ENCOUNTER — LAB (OUTPATIENT)
Dept: LAB | Facility: HOSPITAL | Age: 42
End: 2022-03-22

## 2022-03-22 DIAGNOSIS — K91.2 POSTSURGICAL NONABSORPTION: ICD-10-CM

## 2022-03-22 DIAGNOSIS — D50.8 IRON DEFICIENCY ANEMIA FOLLOWING BARIATRIC SURGERY: ICD-10-CM

## 2022-03-22 DIAGNOSIS — Z3A.29 29 WEEKS GESTATION OF PREGNANCY: ICD-10-CM

## 2022-03-22 DIAGNOSIS — K95.89 IRON DEFICIENCY ANEMIA FOLLOWING BARIATRIC SURGERY: ICD-10-CM

## 2022-03-22 LAB
BASOPHILS # BLD AUTO: 0.03 10*3/MM3 (ref 0–0.2)
BASOPHILS NFR BLD AUTO: 0.4 % (ref 0–1.5)
DEPRECATED RDW RBC AUTO: 45.7 FL (ref 37–54)
EOSINOPHIL # BLD AUTO: 0.09 10*3/MM3 (ref 0–0.4)
EOSINOPHIL NFR BLD AUTO: 1.2 % (ref 0.3–6.2)
ERYTHROCYTE [DISTWIDTH] IN BLOOD BY AUTOMATED COUNT: 13.3 % (ref 12.3–15.4)
FERRITIN SERPL-MCNC: 14.6 NG/ML (ref 11–207)
HCT VFR BLD AUTO: 37.4 % (ref 34–46.6)
HGB BLD-MCNC: 12.4 G/DL (ref 12–15.9)
IMM GRANULOCYTES # BLD AUTO: 0.14 10*3/MM3 (ref 0–0.05)
IMM GRANULOCYTES NFR BLD AUTO: 1.8 % (ref 0–0.5)
IRON 24H UR-MRATE: 101 MCG/DL (ref 37–145)
IRON SATN MFR SERPL: 18 % (ref 14–48)
LYMPHOCYTES # BLD AUTO: 1.43 10*3/MM3 (ref 0.7–3.1)
LYMPHOCYTES NFR BLD AUTO: 18.7 % (ref 19.6–45.3)
MCH RBC QN AUTO: 31.2 PG (ref 26.6–33)
MCHC RBC AUTO-ENTMCNC: 33.2 G/DL (ref 31.5–35.7)
MCV RBC AUTO: 94.2 FL (ref 79–97)
MONOCYTES # BLD AUTO: 0.56 10*3/MM3 (ref 0.1–0.9)
MONOCYTES NFR BLD AUTO: 7.3 % (ref 5–12)
NEUTROPHILS NFR BLD AUTO: 5.38 10*3/MM3 (ref 1.7–7)
NEUTROPHILS NFR BLD AUTO: 70.6 % (ref 42.7–76)
NRBC BLD AUTO-RTO: 0 /100 WBC (ref 0–0.2)
PLATELET # BLD AUTO: 254 10*3/MM3 (ref 140–450)
PMV BLD AUTO: 10.2 FL (ref 6–12)
RBC # BLD AUTO: 3.97 10*6/MM3 (ref 3.77–5.28)
TIBC SERPL-MCNC: 554 MCG/DL (ref 249–505)
TRANSFERRIN SERPL-MCNC: 396 MG/DL (ref 200–360)
VIT B12 BLD-MCNC: 290 PG/ML (ref 211–946)
WBC NRBC COR # BLD: 7.63 10*3/MM3 (ref 3.4–10.8)

## 2022-03-22 PROCEDURE — 83540 ASSAY OF IRON: CPT

## 2022-03-22 PROCEDURE — 85025 COMPLETE CBC W/AUTO DIFF WBC: CPT

## 2022-03-22 PROCEDURE — 82728 ASSAY OF FERRITIN: CPT

## 2022-03-22 PROCEDURE — 36415 COLL VENOUS BLD VENIPUNCTURE: CPT

## 2022-03-22 PROCEDURE — 84466 ASSAY OF TRANSFERRIN: CPT

## 2022-03-22 PROCEDURE — 82607 VITAMIN B-12: CPT | Performed by: INTERNAL MEDICINE

## 2022-03-24 ENCOUNTER — APPOINTMENT (OUTPATIENT)
Dept: LAB | Facility: HOSPITAL | Age: 42
End: 2022-03-24

## 2022-03-24 ENCOUNTER — OFFICE VISIT (OUTPATIENT)
Dept: ONCOLOGY | Facility: CLINIC | Age: 42
End: 2022-03-24

## 2022-03-24 VITALS
OXYGEN SATURATION: 100 % | SYSTOLIC BLOOD PRESSURE: 136 MMHG | TEMPERATURE: 97.1 F | HEART RATE: 106 BPM | BODY MASS INDEX: 39.62 KG/M2 | WEIGHT: 252.4 LBS | DIASTOLIC BLOOD PRESSURE: 77 MMHG | RESPIRATION RATE: 18 BRPM | HEIGHT: 67 IN

## 2022-03-24 DIAGNOSIS — D50.8 IRON DEFICIENCY ANEMIA FOLLOWING BARIATRIC SURGERY: Primary | ICD-10-CM

## 2022-03-24 DIAGNOSIS — Z3A.35 35 WEEKS GESTATION OF PREGNANCY: ICD-10-CM

## 2022-03-24 DIAGNOSIS — K95.89 IRON DEFICIENCY ANEMIA FOLLOWING BARIATRIC SURGERY: Primary | ICD-10-CM

## 2022-03-24 PROCEDURE — 99214 OFFICE O/P EST MOD 30 MIN: CPT | Performed by: INTERNAL MEDICINE

## 2022-03-24 NOTE — PROGRESS NOTES
Subjective    REASON FOR FOLLOW UP:  Iron deficiency in the setting of previous gastric bypass surgery requiring IV iron therapy.       HISTORY OF PRESENT ILLNESS: Patient is a 41 y.o. female with a history of iron deficiency anemia.  She had gastric bypass surgery in , and has required multiple doses of IV iron in the past.  The last time she had iron was 2020 when she received 2 doses of IV Injectafer 750 mg each on 2020 and 2020.    She is here for her follow-up and assessment of iron status.  She is now pregnant with her second daughter with a due date around East.  Her labs performed last week on 3/22/2022 showed a normal hemoglobin of 12.4 Her iron saturation was 18% with a ferritin of 14.6.     We also checked her B12 level on 3/22/2022 which was low normal with a value of 290.    She is going to have a scheduled  in about 3 weeks.  We would like to try to get in a couple of doses of IV Venofer prior to delivery.  We also will give her a B12 shot with each of those doses.      History of Present Illness      Past Medical History, Past Surgical History, Social History, Family History have been reviewed and are without significant changes except as mentioned.    Review of Systems   Constitutional: Positive for fatigue. Negative for activity change, appetite change, chills and fever.   HENT: Negative for mouth sores, nosebleeds and trouble swallowing.    Respiratory: Negative for cough and shortness of breath.    Cardiovascular: Negative for chest pain and leg swelling.   Gastrointestinal: Negative for abdominal pain, constipation, diarrhea, nausea and vomiting.   Genitourinary: Negative for difficulty urinating.   Skin: Negative for rash.   Neurological: Negative for dizziness, weakness and numbness.   Hematological: Negative for adenopathy. Does not bruise/bleed easily.   Psychiatric/Behavioral: Negative for sleep disturbance.        Medications:  The current medication  "list was reviewed in the EMR    ALLERGIES:    Allergies   Allergen Reactions   • Eletriptan Palpitations   • Sumatriptan Palpitations       Objective      Vitals:    03/24/22 1434   Resp: 18   Temp: 97.1 °F (36.2 °C)   TempSrc: Temporal   Weight: 114 kg (252 lb 6.4 oz)   Height: 170.5 cm (67.13\")   PainSc:   4   PainLoc: Head     Current Status 11/4/2021   ECOG score 0       Physical Exam   Constitutional: She is oriented to person, place, and time. She appears well-developed. No distress.   HENT:   Head: Normocephalic and atraumatic.   Mouth/Throat: No oropharyngeal exudate.   Eyes: Pupils are equal, round, and reactive to light.   Cardiovascular: Normal rate, regular rhythm and normal heart sounds.   No murmur heard.  Pulmonary/Chest: Effort normal and breath sounds normal. No respiratory distress. She has no wheezes. She has no rhonchi. She has no rales.   Abdominal: Soft. Normal appearance and bowel sounds are normal. She exhibits no distension.   Musculoskeletal: Normal range of motion.   Neurological: She is alert and oriented to person, place, and time.   Skin: Skin is warm and dry. No rash noted.   Vitals reviewed.  I have reexamined the patient and the results are consistent with the previously documented exam. Rogelio Buenrostro MD         RECENT LABS:  Hematology WBC   Date Value Ref Range Status   03/22/2022 7.63 3.40 - 10.80 10*3/mm3 Final     RBC   Date Value Ref Range Status   03/22/2022 3.97 3.77 - 5.28 10*6/mm3 Final     Hemoglobin   Date Value Ref Range Status   03/22/2022 12.4 12.0 - 15.9 g/dL Final     Hematocrit   Date Value Ref Range Status   03/22/2022 37.4 34.0 - 46.6 % Final     Platelets   Date Value Ref Range Status   03/22/2022 254 140 - 450 10*3/mm3 Final            Lab Results   Component Value Date    IRON 101 03/22/2022    TIBC 554 (H) 03/22/2022    FERRITIN 14.60 03/22/2022   IRON SATURATION 18%        Assessment/Plan   1.  Recurring iron deficiency in a patient with previous gastric " bypass surgery.  Has required IV iron infusions multiple times  Patient received IV Injectafer x2 doses in 2020.   2.  She is currently pregnant with her second child and due to deliver by scheduled  in about 3 weeks.    3.  Her latest iron studies from 3/22/2022 to qualify her for additional IV iron and we will try to schedule this as soon as possible to get in a couple doses of IV iron prior to her upcoming delivery.      PLAN:  1. We will schedule 2 doses of IV Venofer 500 mg each delivered 1 week apart  2. We will also administer B12 shot with each of the doses of iron.  3. Her scheduled  will be about 3 weeks from now.  4. MD follow-up will be scheduled in 3 months with repeat labs drawn 1 week prior to that visit.          Rogelio Buenrostro MD        3/24/2022      CC:

## 2022-03-28 ENCOUNTER — HOSPITAL ENCOUNTER (OUTPATIENT)
Facility: HOSPITAL | Age: 42
Setting detail: OBSERVATION
Discharge: HOME | End: 2022-03-28
Attending: OBSTETRICS & GYNECOLOGY | Admitting: OBSTETRICS & GYNECOLOGY

## 2022-03-28 VITALS
HEIGHT: 67 IN | HEART RATE: 95 BPM | TEMPERATURE: 98.5 F | RESPIRATION RATE: 18 BRPM | BODY MASS INDEX: 39.53 KG/M2 | DIASTOLIC BLOOD PRESSURE: 80 MMHG | SYSTOLIC BLOOD PRESSURE: 128 MMHG

## 2022-03-28 PROCEDURE — G0378 HOSPITAL OBSERVATION PER HR: HCPCS

## 2022-03-28 PROCEDURE — 59025 FETAL NON-STRESS TEST: CPT

## 2022-03-28 RX ORDER — ASPIRIN 81 MG/1
81 TABLET, CHEWABLE ORAL DAILY
COMMUNITY

## 2022-03-29 ENCOUNTER — INFUSION (OUTPATIENT)
Dept: ONCOLOGY | Facility: HOSPITAL | Age: 42
End: 2022-03-29

## 2022-03-29 VITALS
TEMPERATURE: 97.3 F | HEART RATE: 88 BPM | DIASTOLIC BLOOD PRESSURE: 85 MMHG | OXYGEN SATURATION: 99 % | RESPIRATION RATE: 16 BRPM | WEIGHT: 249.8 LBS | BODY MASS INDEX: 39.12 KG/M2 | SYSTOLIC BLOOD PRESSURE: 137 MMHG

## 2022-03-29 DIAGNOSIS — D50.8 IRON DEFICIENCY ANEMIA SECONDARY TO INADEQUATE DIETARY IRON INTAKE: Primary | ICD-10-CM

## 2022-03-29 DIAGNOSIS — K95.89 IRON DEFICIENCY ANEMIA FOLLOWING BARIATRIC SURGERY: ICD-10-CM

## 2022-03-29 DIAGNOSIS — D50.8 IRON DEFICIENCY ANEMIA FOLLOWING BARIATRIC SURGERY: ICD-10-CM

## 2022-03-29 DIAGNOSIS — K91.2 POSTSURGICAL NONABSORPTION: ICD-10-CM

## 2022-03-29 PROCEDURE — 96365 THER/PROPH/DIAG IV INF INIT: CPT

## 2022-03-29 PROCEDURE — 96366 THER/PROPH/DIAG IV INF ADDON: CPT

## 2022-03-29 PROCEDURE — 96372 THER/PROPH/DIAG INJ SC/IM: CPT

## 2022-03-29 PROCEDURE — 25010000002 CYANOCOBALAMIN PER 1000 MCG: Performed by: INTERNAL MEDICINE

## 2022-03-29 PROCEDURE — 63710000001 DIPHENHYDRAMINE PER 50 MG: Performed by: INTERNAL MEDICINE

## 2022-03-29 PROCEDURE — 25010000002 IRON SUCROSE PER 1 MG: Performed by: INTERNAL MEDICINE

## 2022-03-29 RX ORDER — DIPHENHYDRAMINE HCL 25 MG
25 CAPSULE ORAL ONCE
Status: COMPLETED | OUTPATIENT
Start: 2022-03-29 | End: 2022-03-29

## 2022-03-29 RX ORDER — ACETAMINOPHEN 325 MG/1
650 TABLET ORAL ONCE
Status: COMPLETED | OUTPATIENT
Start: 2022-03-29 | End: 2022-03-29

## 2022-03-29 RX ORDER — SODIUM CHLORIDE 9 MG/ML
250 INJECTION, SOLUTION INTRAVENOUS ONCE
Status: COMPLETED | OUTPATIENT
Start: 2022-03-29 | End: 2022-03-29

## 2022-03-29 RX ORDER — CYANOCOBALAMIN 1000 UG/ML
1000 INJECTION, SOLUTION INTRAMUSCULAR; SUBCUTANEOUS ONCE
Status: COMPLETED | OUTPATIENT
Start: 2022-03-29 | End: 2022-03-29

## 2022-03-29 RX ADMIN — SODIUM CHLORIDE 250 ML: 9 INJECTION, SOLUTION INTRAVENOUS at 10:16

## 2022-03-29 RX ADMIN — DIPHENHYDRAMINE HYDROCHLORIDE 25 MG: 25 CAPSULE ORAL at 10:17

## 2022-03-29 RX ADMIN — CYANOCOBALAMIN 1000 MCG: 1000 INJECTION, SOLUTION INTRAMUSCULAR at 10:18

## 2022-03-29 RX ADMIN — IRON SUCROSE 500 MG: 20 INJECTION, SOLUTION INTRAVENOUS at 10:41

## 2022-03-29 RX ADMIN — ACETAMINOPHEN 650 MG: 325 TABLET ORAL at 10:17

## 2022-04-05 ENCOUNTER — INFUSION (OUTPATIENT)
Dept: ONCOLOGY | Facility: HOSPITAL | Age: 42
End: 2022-04-05

## 2022-04-05 VITALS
RESPIRATION RATE: 16 BRPM | DIASTOLIC BLOOD PRESSURE: 80 MMHG | WEIGHT: 253.8 LBS | OXYGEN SATURATION: 100 % | BODY MASS INDEX: 39.75 KG/M2 | SYSTOLIC BLOOD PRESSURE: 146 MMHG | HEART RATE: 98 BPM | TEMPERATURE: 97 F

## 2022-04-05 DIAGNOSIS — D50.8 IRON DEFICIENCY ANEMIA SECONDARY TO INADEQUATE DIETARY IRON INTAKE: Primary | ICD-10-CM

## 2022-04-05 DIAGNOSIS — K95.89 IRON DEFICIENCY ANEMIA FOLLOWING BARIATRIC SURGERY: ICD-10-CM

## 2022-04-05 DIAGNOSIS — K91.2 POSTSURGICAL NONABSORPTION: ICD-10-CM

## 2022-04-05 DIAGNOSIS — D50.8 IRON DEFICIENCY ANEMIA FOLLOWING BARIATRIC SURGERY: ICD-10-CM

## 2022-04-05 PROCEDURE — 25010000002 IRON SUCROSE PER 1 MG: Performed by: INTERNAL MEDICINE

## 2022-04-05 PROCEDURE — 96372 THER/PROPH/DIAG INJ SC/IM: CPT

## 2022-04-05 PROCEDURE — 25010000002 METHYLPREDNISOLONE PER 125 MG: Performed by: INTERNAL MEDICINE

## 2022-04-05 PROCEDURE — 63710000001 DIPHENHYDRAMINE PER 50 MG: Performed by: INTERNAL MEDICINE

## 2022-04-05 PROCEDURE — 96366 THER/PROPH/DIAG IV INF ADDON: CPT

## 2022-04-05 PROCEDURE — 96365 THER/PROPH/DIAG IV INF INIT: CPT

## 2022-04-05 PROCEDURE — 25010000002 CYANOCOBALAMIN PER 1000 MCG: Performed by: INTERNAL MEDICINE

## 2022-04-05 PROCEDURE — 63710000001 DIPHENHYDRAMINE PER 50 MG

## 2022-04-05 PROCEDURE — 96375 TX/PRO/DX INJ NEW DRUG ADDON: CPT

## 2022-04-05 RX ORDER — METHYLPREDNISOLONE SODIUM SUCCINATE 125 MG/2ML
25 INJECTION, POWDER, LYOPHILIZED, FOR SOLUTION INTRAMUSCULAR; INTRAVENOUS ONCE
Status: COMPLETED | OUTPATIENT
Start: 2022-04-05 | End: 2022-04-05

## 2022-04-05 RX ORDER — DIPHENHYDRAMINE HCL 25 MG
25 CAPSULE ORAL ONCE
Status: COMPLETED | OUTPATIENT
Start: 2022-04-05 | End: 2022-04-05

## 2022-04-05 RX ORDER — ACETAMINOPHEN 325 MG/1
650 TABLET ORAL ONCE
Status: COMPLETED | OUTPATIENT
Start: 2022-04-05 | End: 2022-04-05

## 2022-04-05 RX ORDER — SODIUM CHLORIDE 9 MG/ML
250 INJECTION, SOLUTION INTRAVENOUS ONCE
Status: COMPLETED | OUTPATIENT
Start: 2022-04-05 | End: 2022-04-05

## 2022-04-05 RX ORDER — CYANOCOBALAMIN 1000 UG/ML
1000 INJECTION, SOLUTION INTRAMUSCULAR; SUBCUTANEOUS ONCE
Status: COMPLETED | OUTPATIENT
Start: 2022-04-05 | End: 2022-04-05

## 2022-04-05 RX ADMIN — DIPHENHYDRAMINE HYDROCHLORIDE 25 MG: 25 CAPSULE ORAL at 10:22

## 2022-04-05 RX ADMIN — SODIUM CHLORIDE 250 ML: 9 INJECTION, SOLUTION INTRAVENOUS at 10:49

## 2022-04-05 RX ADMIN — CYANOCOBALAMIN 1000 MCG: 1000 INJECTION, SOLUTION INTRAMUSCULAR at 14:59

## 2022-04-05 RX ADMIN — ACETAMINOPHEN 650 MG: 325 TABLET ORAL at 10:22

## 2022-04-05 RX ADMIN — METHYLPREDNISOLONE SODIUM SUCCINATE 25 MG: 125 INJECTION, POWDER, FOR SOLUTION INTRAMUSCULAR; INTRAVENOUS at 16:24

## 2022-04-05 RX ADMIN — DIPHENHYDRAMINE HYDROCHLORIDE 25 MG: 25 CAPSULE ORAL at 15:42

## 2022-04-05 RX ADMIN — IRON SUCROSE 500 MG: 20 INJECTION, SOLUTION INTRAVENOUS at 10:49

## 2022-04-05 NOTE — NURSING NOTE
Pt called stating she is developing a rash on her chest/neck that is itching. States she is not having any trouble breathing. Pt is being induced on Monday to have her baby, per Natalya HERMAN have pt come back and receive an oral dose of benadryl 25 mg and monitor 30 minutes to see if symptoms resolve. Pt is on her way back to office now. Called to confirm with pharmacy benadryl is okay to give again due to pt receiving this at 1022, this morning. Per Sherley (pharmacist) it is okay for pt to receive.     Spoke to  due to itching getting worse. Per MD 25 mg solu-medrol to be given. Nidhi HERMAN did see pt at chairside and informed her she could take benadryl at home again if needed. Pt is less red, states it is getting better. Okay to dc home. Pt did confirm/understand she can take benadryl again at home if needed.

## 2022-04-11 ENCOUNTER — HOSPITAL ENCOUNTER (INPATIENT)
Facility: HOSPITAL | Age: 42
LOS: 3 days | Discharge: HOME | End: 2022-04-14
Attending: OBSTETRICS & GYNECOLOGY | Admitting: OBSTETRICS & GYNECOLOGY

## 2022-04-11 ENCOUNTER — ANESTHESIA EVENT (OUTPATIENT)
Dept: LABOR AND DELIVERY | Facility: HOSPITAL | Age: 42
End: 2022-04-11

## 2022-04-11 ENCOUNTER — ANESTHESIA (OUTPATIENT)
Dept: LABOR AND DELIVERY | Facility: HOSPITAL | Age: 42
End: 2022-04-11

## 2022-04-11 LAB
ABO GROUP BLD: NORMAL
ALBUMIN SERPL-MCNC: 3.6 G/DL (ref 3.5–5.2)
ALBUMIN/GLOB SERPL: 1.3 G/DL
ALP SERPL-CCNC: 178 U/L (ref 39–117)
ALT SERPL W P-5'-P-CCNC: 14 U/L (ref 1–33)
ANION GAP SERPL CALCULATED.3IONS-SCNC: 10 MMOL/L (ref 5–15)
AST SERPL-CCNC: 15 U/L (ref 1–32)
BILIRUB SERPL-MCNC: 0.3 MG/DL (ref 0–1.2)
BLD GP AB SCN SERPL QL: NEGATIVE
BUN SERPL-MCNC: 7 MG/DL (ref 6–20)
BUN/CREAT SERPL: 13 (ref 7–25)
CALCIUM SPEC-SCNC: 8.6 MG/DL (ref 8.6–10.5)
CHLORIDE SERPL-SCNC: 104 MMOL/L (ref 98–107)
CO2 SERPL-SCNC: 21 MMOL/L (ref 22–29)
CREAT SERPL-MCNC: 0.54 MG/DL (ref 0.57–1)
DEPRECATED RDW RBC AUTO: 44 FL (ref 37–54)
EGFRCR SERPLBLD CKD-EPI 2021: 118.8 ML/MIN/1.73
ERYTHROCYTE [DISTWIDTH] IN BLOOD BY AUTOMATED COUNT: 13.2 % (ref 12.3–15.4)
GLOBULIN UR ELPH-MCNC: 2.7 GM/DL
GLUCOSE SERPL-MCNC: 83 MG/DL (ref 65–99)
HCT VFR BLD AUTO: 39.4 % (ref 34–46.6)
HGB BLD-MCNC: 13.2 G/DL (ref 12–15.9)
MCH RBC QN AUTO: 31.4 PG (ref 26.6–33)
MCHC RBC AUTO-ENTMCNC: 33.5 G/DL (ref 31.5–35.7)
MCV RBC AUTO: 93.8 FL (ref 79–97)
PLATELET # BLD AUTO: 233 10*3/MM3 (ref 140–450)
PMV BLD AUTO: 10.9 FL (ref 6–12)
POTASSIUM SERPL-SCNC: 3.8 MMOL/L (ref 3.5–5.2)
PROT SERPL-MCNC: 6.3 G/DL (ref 6–8.5)
RBC # BLD AUTO: 4.2 10*6/MM3 (ref 3.77–5.28)
RH BLD: POSITIVE
SARS-COV-2 RNA PNL SPEC NAA+PROBE: NOT DETECTED
SODIUM SERPL-SCNC: 135 MMOL/L (ref 136–145)
T&S EXPIRATION DATE: NORMAL
WBC NRBC COR # BLD: 7.83 10*3/MM3 (ref 3.4–10.8)

## 2022-04-11 PROCEDURE — 25010000002 PHENYLEPHRINE 10 MG/ML SOLUTION

## 2022-04-11 PROCEDURE — 80053 COMPREHEN METABOLIC PANEL: CPT | Performed by: OBSTETRICS & GYNECOLOGY

## 2022-04-11 PROCEDURE — 86901 BLOOD TYPING SEROLOGIC RH(D): CPT | Performed by: OBSTETRICS & GYNECOLOGY

## 2022-04-11 PROCEDURE — 86900 BLOOD TYPING SEROLOGIC ABO: CPT | Performed by: OBSTETRICS & GYNECOLOGY

## 2022-04-11 PROCEDURE — 88307 TISSUE EXAM BY PATHOLOGIST: CPT

## 2022-04-11 PROCEDURE — 87635 SARS-COV-2 COVID-19 AMP PRB: CPT | Performed by: OBSTETRICS & GYNECOLOGY

## 2022-04-11 PROCEDURE — 25010000002 CEFAZOLIN IN DEXTROSE 2-4 GM/100ML-% SOLUTION: Performed by: OBSTETRICS & GYNECOLOGY

## 2022-04-11 PROCEDURE — 25010000002 MORPHINE PER 10 MG: Performed by: ANESTHESIOLOGY

## 2022-04-11 PROCEDURE — 25010000002 ONDANSETRON PER 1 MG

## 2022-04-11 PROCEDURE — 86850 RBC ANTIBODY SCREEN: CPT | Performed by: OBSTETRICS & GYNECOLOGY

## 2022-04-11 PROCEDURE — 25010000002 ONDANSETRON PER 1 MG: Performed by: ANESTHESIOLOGY

## 2022-04-11 PROCEDURE — 25010000002 FENTANYL CITRATE (PF) 50 MCG/ML SOLUTION: Performed by: ANESTHESIOLOGY

## 2022-04-11 PROCEDURE — 85027 COMPLETE CBC AUTOMATED: CPT | Performed by: OBSTETRICS & GYNECOLOGY

## 2022-04-11 RX ORDER — FENTANYL CITRATE 50 UG/ML
INJECTION, SOLUTION INTRAMUSCULAR; INTRAVENOUS
Status: COMPLETED | OUTPATIENT
Start: 2022-04-11 | End: 2022-04-11

## 2022-04-11 RX ORDER — EPHEDRINE SULFATE 50 MG/ML
INJECTION, SOLUTION INTRAVENOUS AS NEEDED
Status: DISCONTINUED | OUTPATIENT
Start: 2022-04-11 | End: 2022-04-11 | Stop reason: SURG

## 2022-04-11 RX ORDER — PROMETHAZINE HYDROCHLORIDE 25 MG/1
25 TABLET ORAL EVERY 6 HOURS PRN
Status: DISCONTINUED | OUTPATIENT
Start: 2022-04-11 | End: 2022-04-14 | Stop reason: HOSPADM

## 2022-04-11 RX ORDER — SODIUM CHLORIDE, SODIUM LACTATE, POTASSIUM CHLORIDE, CALCIUM CHLORIDE 600; 310; 30; 20 MG/100ML; MG/100ML; MG/100ML; MG/100ML
125 INJECTION, SOLUTION INTRAVENOUS CONTINUOUS
Status: DISCONTINUED | OUTPATIENT
Start: 2022-04-11 | End: 2022-04-11

## 2022-04-11 RX ORDER — DIPHENHYDRAMINE HCL 25 MG
25 CAPSULE ORAL EVERY 4 HOURS PRN
Status: DISCONTINUED | OUTPATIENT
Start: 2022-04-11 | End: 2022-04-14 | Stop reason: HOSPADM

## 2022-04-11 RX ORDER — CEFAZOLIN SODIUM 2 G/100ML
2 INJECTION, SOLUTION INTRAVENOUS ONCE
Status: COMPLETED | OUTPATIENT
Start: 2022-04-11 | End: 2022-04-11

## 2022-04-11 RX ORDER — METHYLERGONOVINE MALEATE 0.2 MG/ML
200 INJECTION INTRAVENOUS ONCE AS NEEDED
Status: DISCONTINUED | OUTPATIENT
Start: 2022-04-11 | End: 2022-04-11 | Stop reason: HOSPADM

## 2022-04-11 RX ORDER — HYDROCORTISONE 25 MG/G
CREAM TOPICAL 3 TIMES DAILY PRN
Status: DISCONTINUED | OUTPATIENT
Start: 2022-04-11 | End: 2022-04-14 | Stop reason: HOSPADM

## 2022-04-11 RX ORDER — LEVOTHYROXINE SODIUM 0.05 MG/1
50 TABLET ORAL
Status: DISCONTINUED | OUTPATIENT
Start: 2022-04-12 | End: 2022-04-14 | Stop reason: HOSPADM

## 2022-04-11 RX ORDER — OXYTOCIN/0.9 % SODIUM CHLORIDE 30/500 ML
999 PLASTIC BAG, INJECTION (ML) INTRAVENOUS ONCE
Status: COMPLETED | OUTPATIENT
Start: 2022-04-11 | End: 2022-04-11

## 2022-04-11 RX ORDER — IBUPROFEN 800 MG/1
800 TABLET ORAL EVERY 8 HOURS PRN
Status: DISCONTINUED | OUTPATIENT
Start: 2022-04-11 | End: 2022-04-14 | Stop reason: HOSPADM

## 2022-04-11 RX ORDER — SODIUM CHLORIDE 0.9 % (FLUSH) 0.9 %
3-10 SYRINGE (ML) INJECTION AS NEEDED
Status: DISCONTINUED | OUTPATIENT
Start: 2022-04-11 | End: 2022-04-14 | Stop reason: HOSPADM

## 2022-04-11 RX ORDER — OXYCODONE HYDROCHLORIDE AND ACETAMINOPHEN 5; 325 MG/1; MG/1
1 TABLET ORAL EVERY 4 HOURS PRN
Status: DISCONTINUED | OUTPATIENT
Start: 2022-04-11 | End: 2022-04-14 | Stop reason: HOSPADM

## 2022-04-11 RX ORDER — SODIUM CHLORIDE, SODIUM LACTATE, POTASSIUM CHLORIDE, CALCIUM CHLORIDE 600; 310; 30; 20 MG/100ML; MG/100ML; MG/100ML; MG/100ML
125 INJECTION, SOLUTION INTRAVENOUS CONTINUOUS
Status: DISCONTINUED | OUTPATIENT
Start: 2022-04-11 | End: 2022-04-14 | Stop reason: HOSPADM

## 2022-04-11 RX ORDER — MISOPROSTOL 200 UG/1
800 TABLET ORAL ONCE AS NEEDED
Status: DISCONTINUED | OUTPATIENT
Start: 2022-04-11 | End: 2022-04-11 | Stop reason: HOSPADM

## 2022-04-11 RX ORDER — OXYTOCIN/0.9 % SODIUM CHLORIDE 30/500 ML
125 PLASTIC BAG, INJECTION (ML) INTRAVENOUS CONTINUOUS PRN
Status: COMPLETED | OUTPATIENT
Start: 2022-04-11 | End: 2022-04-11

## 2022-04-11 RX ORDER — ONDANSETRON 4 MG/1
4 TABLET, FILM COATED ORAL EVERY 8 HOURS PRN
Status: DISCONTINUED | OUTPATIENT
Start: 2022-04-11 | End: 2022-04-14 | Stop reason: HOSPADM

## 2022-04-11 RX ORDER — DIPHENHYDRAMINE HYDROCHLORIDE 50 MG/ML
25 INJECTION INTRAMUSCULAR; INTRAVENOUS EVERY 4 HOURS PRN
Status: DISCONTINUED | OUTPATIENT
Start: 2022-04-11 | End: 2022-04-14 | Stop reason: HOSPADM

## 2022-04-11 RX ORDER — BUPIVACAINE HYDROCHLORIDE 7.5 MG/ML
INJECTION, SOLUTION EPIDURAL; RETROBULBAR
Status: COMPLETED | OUTPATIENT
Start: 2022-04-11 | End: 2022-04-11

## 2022-04-11 RX ORDER — ONDANSETRON 2 MG/ML
4 INJECTION INTRAMUSCULAR; INTRAVENOUS ONCE AS NEEDED
Status: COMPLETED | OUTPATIENT
Start: 2022-04-11 | End: 2022-04-11

## 2022-04-11 RX ORDER — SODIUM CHLORIDE 0.9 % (FLUSH) 0.9 %
3 SYRINGE (ML) INJECTION EVERY 12 HOURS SCHEDULED
Status: DISCONTINUED | OUTPATIENT
Start: 2022-04-11 | End: 2022-04-14 | Stop reason: HOSPADM

## 2022-04-11 RX ORDER — ACETAMINOPHEN 500 MG
1000 TABLET ORAL ONCE
Status: COMPLETED | OUTPATIENT
Start: 2022-04-11 | End: 2022-04-11

## 2022-04-11 RX ORDER — OXYCODONE AND ACETAMINOPHEN 10; 325 MG/1; MG/1
1 TABLET ORAL EVERY 4 HOURS PRN
Status: DISCONTINUED | OUTPATIENT
Start: 2022-04-11 | End: 2022-04-14 | Stop reason: HOSPADM

## 2022-04-11 RX ORDER — ERYTHROMYCIN 5 MG/G
OINTMENT OPHTHALMIC
Status: ACTIVE
Start: 2022-04-11 | End: 2022-04-12

## 2022-04-11 RX ORDER — SODIUM CHLORIDE 0.9 % (FLUSH) 0.9 %
10 SYRINGE (ML) INJECTION EVERY 12 HOURS SCHEDULED
Status: DISCONTINUED | OUTPATIENT
Start: 2022-04-11 | End: 2022-04-11 | Stop reason: HOSPADM

## 2022-04-11 RX ORDER — HYDROXYZINE 50 MG/1
50 TABLET, FILM COATED ORAL EVERY 6 HOURS PRN
Status: DISCONTINUED | OUTPATIENT
Start: 2022-04-11 | End: 2022-04-14 | Stop reason: HOSPADM

## 2022-04-11 RX ORDER — OXYTOCIN/0.9 % SODIUM CHLORIDE 30/500 ML
250 PLASTIC BAG, INJECTION (ML) INTRAVENOUS CONTINUOUS PRN
Status: DISPENSED | OUTPATIENT
Start: 2022-04-11 | End: 2022-04-11

## 2022-04-11 RX ORDER — FAMOTIDINE 10 MG/ML
20 INJECTION, SOLUTION INTRAVENOUS ONCE AS NEEDED
Status: COMPLETED | OUTPATIENT
Start: 2022-04-11 | End: 2022-04-11

## 2022-04-11 RX ORDER — HYDROMORPHONE HYDROCHLORIDE 1 MG/ML
0.5 INJECTION, SOLUTION INTRAMUSCULAR; INTRAVENOUS; SUBCUTANEOUS
Status: DISCONTINUED | OUTPATIENT
Start: 2022-04-11 | End: 2022-04-11 | Stop reason: HOSPADM

## 2022-04-11 RX ORDER — PROMETHAZINE HYDROCHLORIDE 12.5 MG/1
12.5 SUPPOSITORY RECTAL EVERY 6 HOURS PRN
Status: DISCONTINUED | OUTPATIENT
Start: 2022-04-11 | End: 2022-04-14 | Stop reason: HOSPADM

## 2022-04-11 RX ORDER — SIMETHICONE 80 MG
80 TABLET,CHEWABLE ORAL 4 TIMES DAILY PRN
Status: DISCONTINUED | OUTPATIENT
Start: 2022-04-11 | End: 2022-04-14 | Stop reason: HOSPADM

## 2022-04-11 RX ORDER — PHENYLEPHRINE HYDROCHLORIDE 10 MG/ML
INJECTION INTRAVENOUS AS NEEDED
Status: DISCONTINUED | OUTPATIENT
Start: 2022-04-11 | End: 2022-04-11 | Stop reason: SURG

## 2022-04-11 RX ORDER — SODIUM CHLORIDE 0.9 % (FLUSH) 0.9 %
10 SYRINGE (ML) INJECTION AS NEEDED
Status: DISCONTINUED | OUTPATIENT
Start: 2022-04-11 | End: 2022-04-11 | Stop reason: HOSPADM

## 2022-04-11 RX ORDER — MORPHINE SULFATE 1 MG/ML
INJECTION, SOLUTION EPIDURAL; INTRATHECAL; INTRAVENOUS
Status: COMPLETED | OUTPATIENT
Start: 2022-04-11 | End: 2022-04-11

## 2022-04-11 RX ORDER — DOCUSATE SODIUM 100 MG/1
100 CAPSULE, LIQUID FILLED ORAL 2 TIMES DAILY PRN
Status: DISCONTINUED | OUTPATIENT
Start: 2022-04-11 | End: 2022-04-14 | Stop reason: HOSPADM

## 2022-04-11 RX ORDER — PHYTONADIONE 1 MG/.5ML
INJECTION, EMULSION INTRAMUSCULAR; INTRAVENOUS; SUBCUTANEOUS
Status: ACTIVE
Start: 2022-04-11 | End: 2022-04-12

## 2022-04-11 RX ORDER — ONDANSETRON 2 MG/ML
4 INJECTION INTRAMUSCULAR; INTRAVENOUS EVERY 8 HOURS PRN
Status: DISCONTINUED | OUTPATIENT
Start: 2022-04-11 | End: 2022-04-14 | Stop reason: HOSPADM

## 2022-04-11 RX ORDER — MORPHINE SULFATE 2 MG/ML
2 INJECTION, SOLUTION INTRAMUSCULAR; INTRAVENOUS
Status: ACTIVE | OUTPATIENT
Start: 2022-04-11 | End: 2022-04-12

## 2022-04-11 RX ORDER — NALOXONE HCL 0.4 MG/ML
0.2 VIAL (ML) INJECTION
Status: DISCONTINUED | OUTPATIENT
Start: 2022-04-11 | End: 2022-04-14 | Stop reason: HOSPADM

## 2022-04-11 RX ORDER — CARBOPROST TROMETHAMINE 250 UG/ML
250 INJECTION, SOLUTION INTRAMUSCULAR
Status: DISCONTINUED | OUTPATIENT
Start: 2022-04-11 | End: 2022-04-11 | Stop reason: HOSPADM

## 2022-04-11 RX ADMIN — Medication 999 ML/HR: at 13:34

## 2022-04-11 RX ADMIN — ACETAMINOPHEN 1000 MG: 500 TABLET ORAL at 12:44

## 2022-04-11 RX ADMIN — Medication 125 ML/HR: at 14:58

## 2022-04-11 RX ADMIN — SODIUM CHLORIDE, POTASSIUM CHLORIDE, SODIUM LACTATE AND CALCIUM CHLORIDE 125 ML/HR: 600; 310; 30; 20 INJECTION, SOLUTION INTRAVENOUS at 12:07

## 2022-04-11 RX ADMIN — PHENYLEPHRINE HYDROCHLORIDE 150 MCG: 10 INJECTION, SOLUTION INTRAVENOUS at 13:24

## 2022-04-11 RX ADMIN — PHENYLEPHRINE HYDROCHLORIDE 150 MCG: 10 INJECTION, SOLUTION INTRAVENOUS at 13:21

## 2022-04-11 RX ADMIN — FENTANYL CITRATE 20 MCG: 0.05 INJECTION, SOLUTION INTRAMUSCULAR; INTRAVENOUS at 13:11

## 2022-04-11 RX ADMIN — ONDANSETRON 4 MG: 2 INJECTION INTRAMUSCULAR; INTRAVENOUS at 15:40

## 2022-04-11 RX ADMIN — SODIUM CHLORIDE, POTASSIUM CHLORIDE, SODIUM LACTATE AND CALCIUM CHLORIDE 1000 ML: 600; 310; 30; 20 INJECTION, SOLUTION INTRAVENOUS at 10:58

## 2022-04-11 RX ADMIN — BUPIVACAINE HYDROCHLORIDE 1.6 ML: 7.5 INJECTION, SOLUTION EPIDURAL; RETROBULBAR at 13:11

## 2022-04-11 RX ADMIN — EPHEDRINE SULFATE 10 MG: 50 INJECTION INTRAVENOUS at 13:21

## 2022-04-11 RX ADMIN — FAMOTIDINE 20 MG: 10 INJECTION INTRAVENOUS at 12:44

## 2022-04-11 RX ADMIN — ONDANSETRON 4 MG: 2 INJECTION INTRAMUSCULAR; INTRAVENOUS at 12:44

## 2022-04-11 RX ADMIN — EPHEDRINE SULFATE 10 MG: 50 INJECTION INTRAVENOUS at 13:24

## 2022-04-11 RX ADMIN — CEFAZOLIN SODIUM 2 G: 2 INJECTION, SOLUTION INTRAVENOUS at 12:58

## 2022-04-11 RX ADMIN — MORPHINE SULFATE 200 MCG: 1 INJECTION, SOLUTION EPIDURAL; INTRATHECAL; INTRAVENOUS at 13:11

## 2022-04-11 NOTE — OP NOTE
[facility]   Section Operative Note    Patient Name: Gama Andrade  :  1980  MRN:  [id number]      Date of procedure:  2022        Pre-Operative Dx:   1.  IUP at 39w1d weeks   2. Prior C/S  desires repeat        Postoperative Dx:  Same        Procedure: repeat    via Pfannensteil  with kiwi vaccuum assist     Surgeon: Aleyda Whitley MD      Assistant: Dr ramos     Anesthesia: Spinal      EBL:    Quantitative EBL:  600cc     Quantitative Blood Loss (mL): 764 mL         Specimens: A: placenta     Findings:                           Amniotic Fluid meconium  Placenta Intact, 3 VC  Uterus normal  Tubes and ovaries normal bilaterally              Infant:           Gender: Viable female  infant     Weight: 3835 g (8 lb 7.3 oz)     Apgars: 8  @ 1 minute /     9  @ 5 minutes                 Indication for C/Section:     Prior C/S               Procedure Details:  The patient was taken to the operating room where spinal anesthesia was found to be adequate. She was prepped and draped in the usual sterile manner in the dorsal supine position with leftward tilt. A Pfannenstiel incision was made and carried down to the fascia. The fascia was incised in the mid-line and extended transversely with Bryant scissors. The fascia was grasped and elevated and  from the underlying rectus muscles superiorly and inferiorly. The peritoneum was identified and entered. Peritoneal incision was extended superiorly and inferiorly with good visualization of the bladder. The bladder blade was inserted and the vesicouterine peritoneum was incised transversely and the bladder flap was created digitally. The bladder blade was reinserted. The  lower uterine segment was incised in a transverse fashion.  The head was elevated but not immediately delivered through the inicision.  a kiwi was placed on the occiput and the head was guided gently through the ijncision easily and then it was removed.. The remainder  of the infant was delivered without difficulty. The umbilical cord was clamped and cut and the infant was handed off the field. Cord ph and cord bloods were obtained. The placenta was removed intact and appeared normal. The uterine incision was closed with running locked sutures of 0vicryl. Second layer closure was placed imbricating the first for hemostasis. There was a large hematoma noted at the inferior edge of the incision. It was watched for quite some time and it was not expanding. There was also an area at the right corner that was bleeding but after several 0vicryl sutures, it was hemostatic.  The abdominal cavity was irrigated and cleared of all clot and debris. The uterine incision was inspected and noted to be hemostatic. Rectus muscles were reapproximated in the midline with 0 vicryl.  The fascia was closed with 0 vicryl in running continuous fashion. The subcutaneous tissue was closed with 3-0 vicryl. The skin was closed in subcuticular fashion with 4-0 Vicryl.   Instrument, sponge, and needle counts were correct prior the abdominal closure and at the conclusion of the case. Mother and baby  to recovery room in stable condition.              Complications:     None          Antibiotics: cefazolin (Ancef)                      Aleyda Whitley MD  4/11/2022  14:45 EDT

## 2022-04-11 NOTE — ANESTHESIA PREPROCEDURE EVALUATION
Anesthesia Evaluation     Patient summary reviewed and Nursing notes reviewed   NPO Solid Status: > 8 hours  NPO Liquid Status: > 2 hours           Airway   Mallampati: II  TM distance: >3 FB  Neck ROM: full  Dental - normal exam     Pulmonary - normal exam    breath sounds clear to auscultation  (+) asthma,  Cardiovascular - negative cardio ROS and normal exam    Rhythm: regular  Rate: normal    (-) angina, orthopnea, PND, STEVENS      Neuro/Psych  (+) headaches (Intractable chronic migraine without aura), psychiatric history Depression,    GI/Hepatic/Renal/Endo    (+) obesity,   thyroid problem hypothyroidism    ROS Comment: GASTRIC BYPASS    Musculoskeletal (-) negative ROS    Abdominal    Substance History - negative use     OB/GYN    (+) Pregnant,         Other - negative ROS                       Anesthesia Plan    ASA 2     spinal       Anesthetic plan, all risks, benefits, and alternatives have been provided, discussed and informed consent has been obtained with: patient.        CODE STATUS:

## 2022-04-11 NOTE — ANESTHESIA POSTPROCEDURE EVALUATION
"Patient: Gama Andrade    Procedure Summary     Date: 22 Room / Location:  [facility] /  [facility]    Anesthesia Start: 1302 Anesthesia Stop: 141    Procedure:  SECTION REPEAT (N/A Abdomen) Diagnosis: (repeat)    Surgeons: Aleyda Whitley MD Provider: Dewey Kang MD    Anesthesia Type: spinal ASA Status: 2          Anesthesia Type: spinal    Vitals  Vitals Value Taken Time   /72 22 1600   Temp 36.4 °C (97.5 °F) 22 1430   Pulse 88 22 1609   Resp 16 22 1600   SpO2 100 % 22 1609   Vitals shown include unvalidated device data.        Post Anesthesia Care and Evaluation    Patient participation: complete - patient participated  Level of consciousness: awake and alert  Pain score: 0  Pain management: adequate  Airway patency: patent  Anesthetic complications: No anesthetic complications  PONV Status: none  Cardiovascular status: acceptable  Respiratory status: acceptable  Hydration status: acceptable    Comments: /72   Pulse 84   Temp 36.4 °C (97.5 °F) (Oral)   Resp 16   Ht 170.2 cm (67\")   Wt 113 kg (250 lb)   SpO2 98%   Breastfeeding Yes   BMI 39.16 kg/m²         "

## 2022-04-11 NOTE — H&P
[facility]  Obstetric Preop History and Physical:    Patient Name: Gama Andrade  :  1980  MRN:  [id number]      Chief Complaint   Patient presents with   • Scheduled        Subjective                41 y.o. female  currently at 39w1d, who presents for repeat ltcd. pnc c/b ama, prior csection (no pps), hypothyroidism(levo 50mcg), depression, covid in early preg and rec'd [facility], has a daughter le 2yo, ivf preg, hsv in her partner, gbs negative.               Past OB History:       OB History    Para Term  AB Living   2 1 1 0 0 1   SAB IAB Ectopic Molar Multiple Live Births   0 0 0 0 0 1      # Outcome Date GA Lbr Carrillo/2nd Weight Sex Delivery Anes PTL Lv   2 Current            1 Term 19 40w1d  3570 g (7 lb 13.9 oz) F CS-LTranv EPI N TRISTAN      Birth Comments: or 1 panda      Complications: Fetal Intolerance      Name: ELIJAH ANDRADE      Apgar1: 7  Apgar5: 8       Past Medical History: Past Medical History:   Diagnosis Date   • Anemia    • Arachnoid cyst    • Asthma    • Depression    • Herpes      history of herpes, placed on Valtrex   • Hypothyroidism    • Migraine    • Vitamin D deficiency       Past Surgical History Past Surgical History:   Procedure Laterality Date   • ANKLE SURGERY Left 2021   •  SECTION N/A 2019    Procedure:  SECTION PRIMARY;  Surgeon: Aleyda Whitley MD;  Location: [facility] LABOR DELIVERY;  Service: Obstetrics/Gynecology   • CHOLECYSTECTOMY  2001   • GASTRIC BYPASS  2010   • SINUS SURGERY  10/2002   • WISDOM TOOTH EXTRACTION        Family History: Family History   Problem Relation Age of Onset   • Lupus Mother    • Stroke Father    • Heart disease Father       Social History:  reports that she has never smoked. She has never used smokeless tobacco.   reports previous alcohol use.   reports no history of drug use.    Allergies:     Eletriptan and Sumatriptan     Objective       Vital Signs  Range for the last 24 hours  Temperature: Temp:  [98.4 °F (36.9 °C)] 98.4 °F (36.9 °C)   Temp Source: Temp src: Oral   BP: BP: (127)/(72) 127/72   Pulse: Heart Rate:  [86] 86   Respirations: Resp:  [17] 17   SPO2: SpO2:  [100 %] 100 %      lactated ringers, 125 mL/hr                       Physical Exam:  General: Alert in NAD   Heart Normal rate and regular rhythm   Lungs Clear to auscultation bilaterally     Abdomen Gravid, soft, no masses   Extremities  edema         Cervix: Exam by:     Dilation:     Effacement:     Station:                               FHR:   TOCO:            Assessment/Plan     Assessment: prior ltcd and desires repeat. No pps.     Plan: Recommend proceeding with  section for delivery. Indications for proceeding, risks and benefits have been discussed with patient. All questions answered.         Aleyda Whitley MD  2022  11:24 EDT

## 2022-04-11 NOTE — LACTATION NOTE
This note was copied from a baby's chart.  P2 term baby, 4 hrs old, sleepy per Mom and nursing intermittently. Mom is AMA, she reports nursing her first baby (now 4yo) well and had a good milk supply. She has personal pump at home. Encouraged to call for any assistance or questions.

## 2022-04-12 LAB
BASOPHILS # BLD AUTO: 0.02 10*3/MM3 (ref 0–0.2)
BASOPHILS NFR BLD AUTO: 0.2 % (ref 0–1.5)
DEPRECATED RDW RBC AUTO: 42 FL (ref 37–54)
EOSINOPHIL # BLD AUTO: 0.04 10*3/MM3 (ref 0–0.4)
EOSINOPHIL NFR BLD AUTO: 0.5 % (ref 0.3–6.2)
ERYTHROCYTE [DISTWIDTH] IN BLOOD BY AUTOMATED COUNT: 12.7 % (ref 12.3–15.4)
HCT VFR BLD AUTO: 33.3 % (ref 34–46.6)
HGB BLD-MCNC: 11.3 G/DL (ref 12–15.9)
IMM GRANULOCYTES # BLD AUTO: 0.14 10*3/MM3 (ref 0–0.05)
IMM GRANULOCYTES NFR BLD AUTO: 1.6 % (ref 0–0.5)
LYMPHOCYTES # BLD AUTO: 1.47 10*3/MM3 (ref 0.7–3.1)
LYMPHOCYTES NFR BLD AUTO: 16.6 % (ref 19.6–45.3)
MCH RBC QN AUTO: 31.2 PG (ref 26.6–33)
MCHC RBC AUTO-ENTMCNC: 33.9 G/DL (ref 31.5–35.7)
MCV RBC AUTO: 92 FL (ref 79–97)
MONOCYTES # BLD AUTO: 0.58 10*3/MM3 (ref 0.1–0.9)
MONOCYTES NFR BLD AUTO: 6.6 % (ref 5–12)
NEUTROPHILS NFR BLD AUTO: 6.59 10*3/MM3 (ref 1.7–7)
NEUTROPHILS NFR BLD AUTO: 74.5 % (ref 42.7–76)
NRBC BLD AUTO-RTO: 0 /100 WBC (ref 0–0.2)
PLATELET # BLD AUTO: 223 10*3/MM3 (ref 140–450)
PMV BLD AUTO: 10.4 FL (ref 6–12)
RBC # BLD AUTO: 3.62 10*6/MM3 (ref 3.77–5.28)
WBC NRBC COR # BLD: 8.84 10*3/MM3 (ref 3.4–10.8)

## 2022-04-12 PROCEDURE — 85025 COMPLETE CBC W/AUTO DIFF WBC: CPT | Performed by: OBSTETRICS & GYNECOLOGY

## 2022-04-12 RX ADMIN — OXYCODONE HYDROCHLORIDE AND ACETAMINOPHEN 1 TABLET: 5; 325 TABLET ORAL at 22:12

## 2022-04-12 RX ADMIN — LEVOTHYROXINE SODIUM 50 MCG: 0.05 TABLET ORAL at 05:32

## 2022-04-12 RX ADMIN — SERTRALINE HYDROCHLORIDE 50 MG: 50 TABLET, FILM COATED ORAL at 08:29

## 2022-04-12 RX ADMIN — IBUPROFEN 800 MG: 800 TABLET, FILM COATED ORAL at 05:20

## 2022-04-12 RX ADMIN — OXYCODONE HYDROCHLORIDE AND ACETAMINOPHEN 1 TABLET: 10; 325 TABLET ORAL at 13:40

## 2022-04-12 RX ADMIN — OXYCODONE HYDROCHLORIDE AND ACETAMINOPHEN 1 TABLET: 10; 325 TABLET ORAL at 17:58

## 2022-04-12 RX ADMIN — OXYCODONE HYDROCHLORIDE AND ACETAMINOPHEN 1 TABLET: 5; 325 TABLET ORAL at 05:20

## 2022-04-12 RX ADMIN — DOCUSATE SODIUM 100 MG: 100 CAPSULE, LIQUID FILLED ORAL at 22:08

## 2022-04-12 RX ADMIN — IBUPROFEN 800 MG: 800 TABLET, FILM COATED ORAL at 22:08

## 2022-04-12 RX ADMIN — IBUPROFEN 800 MG: 800 TABLET, FILM COATED ORAL at 13:40

## 2022-04-12 RX ADMIN — OXYCODONE HYDROCHLORIDE AND ACETAMINOPHEN 1 TABLET: 10; 325 TABLET ORAL at 09:37

## 2022-04-12 NOTE — LACTATION NOTE
This note was copied from a baby's chart.  P2T. Mother reports infant latching and voiding well so far, denies any pain with latch. No issues breastfeeding first child. Discussed feeding every 2-3 hours and PRN 10-15 min per side, how to know baby is getting enough, hand expression and when to expect mature milk to come in. Discussed ways to rouse infant and keep stimulated during feedings. Mother has a personal pump. Encouraged to call as needed for assist as needed.

## 2022-04-12 NOTE — PROGRESS NOTES
[facility]   PROGRESS NOTE    Patient Name: Gama Andrade  :  1980  MRN:  [id number]      Post-Op Day 1 S/P    Delivered a female infant.  Subjective     Patient reports:    Pain is well controlled. Voiding and ambulating without difficulty.    Tolerating po. Lochia normal.       The patient plans to breastfeed.         Objective       Vitals: Vital Signs Range for the last 24 hours  Temperature: Temp:  [96.5 °F (35.8 °C)-98.7 °F (37.1 °C)] 98.3 °F (36.8 °C)   Temp Source: Temp src: Oral   BP: BP: (101-130)/(51-80) 116/78   Pulse: Heart Rate:  [73-87] 84   Respirations: Resp:  [16-18] 18         Intake/Output Summary (Last 24 hours) at 2022 1155  Last data filed at 2022 0900  Gross per 24 hour   Intake 2183 ml   Output 2464 ml   Net -281 ml                                              Physical Exam     General Alert and awake, in NAD      CV Regular rate and rhythm      Lungs clear to auscultation bilaterally        Abdomen Soft, non-distended, fundus firm,  2 below umbilicus, appropriately tender      Incision  Dressing clean, dry and intact.      Extremities  1+ edema, calves NT               LABS: Results from last 7 days   Lab Units 22  0750 22  1043   WBC 10*3/mm3 8.84 7.83   HEMOGLOBIN g/dL 11.3* 13.2   HEMATOCRIT % 33.3* 39.4   PLATELETS 10*3/mm3 223 233         Prenatal labs results reviewed:  Yes   Rubella:  Immune  Rh Status:  O pos  RH type   Date Value Ref Range Status   2022 Positive  Final                       Assessment/Plan   : 1. POD 1 S/P C/S: Hemodynamically stable.  Doing well.  Continue routine care.       * No active hospital problems. *          Rosa Elliott, VIRGIL  2022  11:55 EDT   Patient seen and examined. Agree with above assessment.     Joelle España MD  2022  14:03 EDT

## 2022-04-12 NOTE — PLAN OF CARE
Goal Outcome Evaluation:  Plan of Care Reviewed With: patient, spouse        Progress: improving  Outcome Evaluation: stable. slight nausea off and on, but better. IVFs infusing. F/C to bsd draining cl yellow urine. Pain controlled with Duramorph at present. oriented to folder. ambulated to BR with assistance. No c/o or concerns voiced at present. will continue to monitor

## 2022-04-13 RX ADMIN — OXYCODONE HYDROCHLORIDE AND ACETAMINOPHEN 1 TABLET: 10; 325 TABLET ORAL at 04:21

## 2022-04-13 RX ADMIN — SERTRALINE HYDROCHLORIDE 50 MG: 50 TABLET, FILM COATED ORAL at 06:25

## 2022-04-13 RX ADMIN — SIMETHICONE 80 MG: 80 TABLET, CHEWABLE ORAL at 20:30

## 2022-04-13 RX ADMIN — OXYCODONE HYDROCHLORIDE AND ACETAMINOPHEN 1 TABLET: 5; 325 TABLET ORAL at 21:17

## 2022-04-13 RX ADMIN — IBUPROFEN 800 MG: 800 TABLET, FILM COATED ORAL at 06:26

## 2022-04-13 RX ADMIN — OXYCODONE HYDROCHLORIDE AND ACETAMINOPHEN 1 TABLET: 5; 325 TABLET ORAL at 11:08

## 2022-04-13 RX ADMIN — LEVOTHYROXINE SODIUM 50 MCG: 0.05 TABLET ORAL at 06:50

## 2022-04-13 RX ADMIN — DOCUSATE SODIUM 100 MG: 100 CAPSULE, LIQUID FILLED ORAL at 20:30

## 2022-04-13 RX ADMIN — OXYCODONE HYDROCHLORIDE AND ACETAMINOPHEN 1 TABLET: 5; 325 TABLET ORAL at 15:53

## 2022-04-13 RX ADMIN — IBUPROFEN 800 MG: 800 TABLET, FILM COATED ORAL at 15:53

## 2022-04-13 NOTE — LACTATION NOTE
MB RN is with patient so LC    Will round later. Patient acknowledged LC and the number on whiteboard. This is baby #2 and patient feels baby nurses well so far.

## 2022-04-13 NOTE — PLAN OF CARE
Problem: Adult Inpatient Plan of Care  Goal: Plan of Care Review  Outcome: Ongoing, Progressing  Flowsheets (Taken 4/13/2022 0113)  Progress: improving  Plan of Care Reviewed With: patient  Outcome Evaluation:   VSS   pain controlled with PO medications   breastfeeding and bonding with infant  Goal: Patient-Specific Goal (Individualized)  Outcome: Ongoing, Progressing  Goal: Absence of Hospital-Acquired Illness or Injury  Outcome: Ongoing, Progressing  Intervention: Identify and Manage Fall Risk  Recent Flowsheet Documentation  Taken 4/13/2022 0031 by Makenzie Segovia RN  Safety Promotion/Fall Prevention: safety round/check completed  Taken 4/12/2022 2212 by Makenzie Segovia RN  Safety Promotion/Fall Prevention: safety round/check completed  Taken 4/12/2022 2030 by Makenzie Segovia RN  Safety Promotion/Fall Prevention: safety round/check completed  Intervention: Prevent and Manage VTE (Venous Thromboembolism) Risk  Recent Flowsheet Documentation  Taken 4/12/2022 2030 by Makenzie Segovia RN  Activity Management: up ad christian  Goal: Optimal Comfort and Wellbeing  Outcome: Ongoing, Progressing  Intervention: Provide Person-Centered Care  Recent Flowsheet Documentation  Taken 4/12/2022 2030 by Makenzie Segovia RN  Trust Relationship/Rapport:   care explained   choices provided   questions answered   questions encouraged  Goal: Readiness for Transition of Care  Outcome: Ongoing, Progressing   Goal Outcome Evaluation:  Plan of Care Reviewed With: patient        Progress: improving  Outcome Evaluation: VSS; pain controlled with PO medications; breastfeeding and bonding with infant

## 2022-04-13 NOTE — PROGRESS NOTES
[facility]   PROGRESS NOTE    Patient Name: Gama Andrade  :  1980  MRN:  [id number]      Post-Op 2 S/P   Subjective     Patient reports:   Doing well. Pain well controlled. Tolerating regular diet and having flatus.    Lochia normal.       Objective       Vitals: Vital Signs Range for the last 24 hours  Temperature: Temp:  [98 °F (36.7 °C)-98.5 °F (36.9 °C)] 98 °F (36.7 °C)       BP: BP: (116-129)/(71-82) 117/80   Pulse: Heart Rate:  [81-94] 81   Respirations: Resp:  [18-20] 18                                         Physical Exam     General Alert       Abdomen Soft, non-distended, fundus firm, 1 below umbilicus, appropriately tender      Incision  Intact, no erythema or exudate      Extremities Calves NT bilaterally                Assessment/Plan  :   POD 2  -  Doing well.  Continue usual cares.           * No active hospital problems. *               Pamela May, APRN  2022  09:36 EDT

## 2022-04-14 VITALS
TEMPERATURE: 97.9 F | OXYGEN SATURATION: 98 % | SYSTOLIC BLOOD PRESSURE: 136 MMHG | HEART RATE: 67 BPM | DIASTOLIC BLOOD PRESSURE: 84 MMHG | BODY MASS INDEX: 39.24 KG/M2 | RESPIRATION RATE: 18 BRPM | WEIGHT: 250 LBS | HEIGHT: 67 IN

## 2022-04-14 RX ORDER — IBUPROFEN 800 MG/1
800 TABLET ORAL EVERY 8 HOURS PRN
Qty: 60 TABLET | Refills: 0 | Status: SHIPPED | OUTPATIENT
Start: 2022-04-14

## 2022-04-14 RX ORDER — OXYCODONE HYDROCHLORIDE AND ACETAMINOPHEN 5; 325 MG/1; MG/1
2 TABLET ORAL EVERY 4 HOURS PRN
Qty: 30 TABLET | Refills: 0 | Status: SHIPPED | OUTPATIENT
Start: 2022-04-14 | End: 2022-04-17

## 2022-04-14 RX ADMIN — OXYCODONE HYDROCHLORIDE AND ACETAMINOPHEN 1 TABLET: 5; 325 TABLET ORAL at 12:09

## 2022-04-14 RX ADMIN — LEVOTHYROXINE SODIUM 50 MCG: 0.05 TABLET ORAL at 06:15

## 2022-04-14 RX ADMIN — OXYCODONE HYDROCHLORIDE AND ACETAMINOPHEN 1 TABLET: 5; 325 TABLET ORAL at 08:06

## 2022-04-14 RX ADMIN — IBUPROFEN 800 MG: 800 TABLET, FILM COATED ORAL at 08:06

## 2022-04-14 RX ADMIN — IBUPROFEN 800 MG: 800 TABLET, FILM COATED ORAL at 00:27

## 2022-04-14 RX ADMIN — SERTRALINE HYDROCHLORIDE 50 MG: 50 TABLET, FILM COATED ORAL at 08:06

## 2022-04-14 NOTE — LACTATION NOTE
Mom currently BF baby on left breast. Baby is latched and BF well at this time. Educated on milk coming in, maternal diet and hydration, baby's expected output and weight gain. Mom asked about insurance pumping until milk comes in. Mom has [facility] info if needing f/u    Lactation Consult Note    Evaluation Completed: 2022 09:05 EDT  Patient Name: Gama Andrade  :  1980  MRN:  [id number]     REFERRAL  INFORMATION:                                         DELIVERY HISTORY:        Skin to skin initiation date/time: 2022  1:46 PM   Skin to skin end date/time:           MATERNAL ASSESSMENT:                               INFANT ASSESSMENT:  Information for the patient's :  Andrew Andrade [[id number]]   No past medical history on file.                                                                                                     MATERNAL INFANT FEEDING:                                                                       EQUIPMENT TYPE:                                 BREAST PUMPING:          LACTATION REFERRALS:

## 2022-04-14 NOTE — DISCHARGE SUMMARY
Date of Discharge:  2022    Discharge Diagnosis: repeat c/s    Presenting Problem/History of Present Illness  Pregnancy [Z34.90]       Hospital Course  Patient is a 41 y.o. female presented for scheduled repeat c/s.  Delivered viable female infant per Dr. Whitley.  Ready for d/c today.      Procedures Performed  Procedure(s):   SECTION REPEAT         Condition on Discharge:  Post-Op Day 3 S/P   Subjective     Patient reports:    Doing well - ready for discharge. Pain well controlled. Tolerating po and   having flatus. Voiding and ambulating without difficulty. Lochia normal.     Vital Signs  Temp:  [97.7 °F (36.5 °C)-98.1 °F (36.7 °C)] 97.9 °F (36.6 °C)  Heart Rate:  [67-98] 67  Resp:  [18] 18  BP: (131-136)/(77-84) 136/84    Physical Exam    Gen Alert and awake   Abdomen Soft, ND,  Fundus firm with minimal tenderness   Incision  Intact, without erythema or exudate   Extremities Calves NT bilaterally     Assessment/Plan ]   Assessment:  POD 3  -  Doing well. Stable for discharge.     Plan:    Instructions reviewed       Consults:   Consults     No orders found from 3/13/2022 to 2022.          Discharge Disposition  Home or Self Care    Discharge Medications     Discharge Medications      New Medications      Instructions Start Date   ibuprofen 800 MG tablet  Commonly known as: ADVIL,MOTRIN   800 mg, Oral, Every 8 Hours PRN      oxyCODONE-acetaminophen 5-325 MG per tablet  Commonly known as: PERCOCET   2 tablets, Oral, Every 4 Hours PRN         Continue These Medications      Instructions Start Date   Accu-Chek FastClix Lancets misc   1 each, Other, 4 Times Daily, Use to test 4 times daily      aspirin 81 MG chewable tablet   81 mg, Oral, Daily      levothyroxine 50 MCG tablet  Commonly known as: SYNTHROID, LEVOTHROID   50 mcg, Oral, Daily      PRENATAL VITAMINS PO   Oral      sertraline 50 MG tablet  Commonly known as: ZOLOFT   50 mg, Oral, Daily         Stop These Medications    CALCIUM  PO            The patient has been prescribed a controlled substance.  She has been counseled on the risks associated with using the medication.   The addictive potential of this medication and alternatives were discussed carefully with this patient and she demonstrated understanding.  A [facility] report has been obtained and reviewed.    Activity at Discharge: restrictions reviewed    Follow-up Appointments  Future Appointments   Date Time Provider Department Center   6/16/2022  2:30 PM LAB CHAIR 3 [facility]  [facility]   6/16/2022  3:00 PM Rogelio Buenrostro MD [facility]         Test Results Pending at Discharge       Pamela May, APRSRUTHI  04/14/22  09:24 EDT

## 2022-11-04 NOTE — PLAN OF CARE
Chief Complaint   Patient presents with   • Well Child   • Office Visit        HISTORY AND PRESENT ILLNESS   Elizabeth Méndez is a 24 month old 2020 female who presents for a well child exam.  Patient presents with Mother.     Patient is mostly at home.  Good eater, three meals per day.  Sleeping overnight,, 730-6am, 1hour nap in the day time.    Goes up and down steps without help, Understands two-step commands and 50 words    Additional concerns today: cough with clear drainage for 2 months    Past Medical History:   Diagnosis Date   • Recurrent otitis media of both ears       Family History   Problem Relation Age of Onset   • Patient is unaware of any medical problems Mother    • Patient is unaware of any medical problems Father    • Patient is unaware of any medical problems Brother    • Patient is unaware of any medical problems Maternal Grandmother    • Hyperlipidemia Maternal Grandfather    • Patient is unaware of any medical problems Paternal Grandmother    • Patient is unaware of any medical problems Paternal Grandfather    • Asthma Maternal Aunt       Pediatric History   Patient Parents/Guardians   • sonny johnson (Mother/Guardian)     Other Topics Concern   • Not on file   Social History Narrative   • Not on file       Growth parameters are noted and are appropriate for age.    No visits with results within 1 Day(s) from this visit.   Latest known visit with results is:   Walk In on 07/02/2022   Component Date Value Ref Range Status   • POCT RSV RAPID 07/02/2022 Not Detected  Negative, Not Detected, Detected, N/A, Not Tested Final   • Internal Procedural Controls Accep* 07/02/2022 Yes   Final     ROS  Constitutional:  No fevers.  Normal intake of fluids and solids.  Skin:  No rash or lesion.  HEENT:  No eye drainage, ear pain, nasal congestion or sore throat.   Respiratory:  No cough, wheezing or breathing difficulty.   GI:  No vomiting or diarrhea.    : Normal wet diapers  Musculoskeletal: No  Problem: Patient Care Overview  Goal: Plan of Care Review  Outcome: Ongoing (interventions implemented as appropriate)   02/09/19 0652   Coping/Psychosocial   Plan of Care Reviewed With patient;spouse   Plan of Care Review   Progress improving   OTHER   Outcome Summary C/S delivery at 0547 d/t fetal intolerance of labor. Bleeding has been scant, uterus firm, midline, 1 cm below the umbilicus. 5 mg of ephedrine given at 0637. Will continue to monitor.     Goal: Individualization and Mutuality  Outcome: Ongoing (interventions implemented as appropriate)   02/09/19 0652   Individualization   Patient Specific Preferences epidural if wanted, ROCIO, breastfeeding   Patient Specific Goals (Include Timeframe) healthy mom and baby   Patient Specific Interventions epidural   Mutuality/Individual Preferences   What Anxieties, Fears, Concerns, or Questions Do You Have About Your Care? C/S delivery   How Would You and/or Your Support Person Like to Participate in Your Care? FOB in OR for delivery   Mutuality/Individual Preferences   How to Address Anxieties/Fears education, keep updated with POC     Goal: Discharge Needs Assessment  Outcome: Ongoing (interventions implemented as appropriate)   02/09/19 0652   Discharge Needs Assessment   Readmission Within the Last 30 Days no previous admission in last 30 days   Concerns to be Addressed no discharge needs identified   Patient/Family Anticipates Transition to home;home with family   Patient/Family Anticipated Services at Transition none   Transportation Concerns car, none   Transportation Anticipated car, drives self;family or friend will provide   Anticipated Changes Related to Illness none   Equipment Needed After Discharge none   Offered/Gave Vendor List no   Disability   Equipment Currently Used at Home none     Goal: Interprofessional Rounds/Family Conf  Outcome: Ongoing (interventions implemented as appropriate)   02/09/19 0652   Interdisciplinary Rounds/Family Conf    Participants nursing;patient;physician;other (see comments)  (spouse)       Problem: Labor (Cervical Ripen, Induct, Augment) (Adult,Obstetrics,Pediatric)  Goal: Signs and Symptoms of Listed Potential Problems Will be Absent, Minimized or Managed (Labor)  Outcome: Outcome(s) achieved Date Met: 19   Goal/Outcome Evaluation   Problems Assessed (Labor) all   Problems Present (Labor) none       Problem: Anesthesia/Analgesia, Neuraxial (Obstetrics)  Goal: Signs and Symptoms of Listed Potential Problems Will be Absent, Minimized or Managed (Anesthesia/Analgesia, Neuraxial)  Outcome: Ongoing (interventions implemented as appropriate)   19   Goal/Outcome Evaluation   Problems Assessed (Neuraxial Anesthesia/Analgesia, OB) all   Problems Present (Neuraxial Anesth OB) none       Problem: Fall Risk,  (Adult,Obstetrics,Pediatric)  Goal: Identify Related Risk Factors and Signs and Symptoms  Outcome: Ongoing (interventions implemented as appropriate)   19   Fall Risk,  (Adult,Obstetrics,Pediatric)   Related Risk Factors (Fall Risk, ) fatigue;medication side effects;prolonged bed rest   Signs and Symptoms (Fall Risk, ) presence of fall risk factors     Goal: Absence of Maternal Fall  Outcome: Ongoing (interventions implemented as appropriate)   19   Fall Risk,  (Adult,Obstetrics,Pediatric)   Absence of Maternal Fall making progress toward outcome     Goal: Absence of Vernon Fall/Drop  Outcome: Ongoing (interventions implemented as appropriate)   19   Fall Risk,  (Adult,Obstetrics,Pediatric)   Absence of Vernon Fall/Drop making progress toward outcome       Problem: Skin Injury Risk (Adult)  Goal: Identify Related Risk Factors and Signs and Symptoms  Outcome: Ongoing (interventions implemented as appropriate)   19   Skin Injury Risk (Adult)   Related Risk Factors (Skin Injury Risk) edema;mobility  decrease in range of motion, pain with movement, joint swelling or extremity guarding.    PHYSICAL EXAM  VITAL SIGNS:   Visit Vitals  Pulse 110   Temp 97.9 °F (36.6 °C) (Temporal)   Resp 24   Ht 34\" (86.4 cm)   Wt 12.1 kg (26 lb 9.1 oz)   SpO2 98%   BMI 16.16 kg/m²    48 %ile (Z= -0.05) based on [facility] (Girls, 2-20 Years) weight-for-age data using vitals from 11/4/2022.  GENERAL: Well appearing female toddler in no acute distress. Alert and  consolable.  SKIN:  Warm, normal turgor.  No cyanosis.  No rash.   HEAD:  Normocephalic, atraumatic.    EYES:  Conjunctivae appear normal, non-injected, non-icteric.  NOSE:  Appears normal.  No flaring.  EARS:  Normal pinnae, no preauricular skin tags. Tympanic membranes are transparent with normal landmarks.  THROAT:  Oropharynx with moist mucous membranes and no lesions.  NECK:  Supple.  No lymphadenopathy or masses.  HEART:  Regular rate and rhythm. Normal S1, S2. No murmurs.   LUNGS:  Clear to auscultation. No wheezes, rales, rhonchi.  Normal work effort with breathing.  ABDOMEN:  Soft, nontender.  No organomegaly or masses.  GENITALIA: Florin stage 1.  Normal genitalia.   EXTREMITIES:  Hips - normal range of motion. Equal femoral pulses. Peripheral pulses 2/4.  SPINE: Spine appears straight.  Normal sacrum.  NEUROLOGIC:  Normal tone, bulk, strength.    ASSESSMENT & PLAN  Diagnoses and all orders for this visit:  Encounter for well child visit at 2 years of age  Runny nose  -     SERVICE TO ALLERGY & IMMUNOLOGY      1. All parental concerns and questions discussed.  2. Anticipatory guidance provided, handout/s given Development  3. Diet  4. Tobacco-free home  5. Dental care  6. Immunizations per orders.  Risks, benefits, and side effects discussed.  7.    Orders for immunizations given today per the recommended schedule.  8. VIS for Immunizations given    Patient Education  [x] Parent Handout  [x] Recommend routine dental exam  [x] Reviewed and discussed parent questions /  concerns       Anticipatory Guidance reviewed, discussed diet, safety and development    Return in about 6 months (around 5/4/2023).    Missy Phelan DO   impaired;moisture     Goal: Skin Health and Integrity  Outcome: Ongoing (interventions implemented as appropriate)   19 0652   Skin Injury Risk (Adult)   Skin Health and Integrity making progress toward outcome       Problem:  Delivery (Adult,Obstetrics,Pediatric)  Goal: Signs and Symptoms of Listed Potential Problems Will be Absent, Minimized or Managed ( Delivery)  Outcome: Outcome(s) achieved Date Met: 19 0652   Goal/Outcome Evaluation   Problems Assessed ( Delivery) all   Problems Present ( Delivery) none

## 2023-05-13 NOTE — TELEPHONE ENCOUNTER
----- Message from Parris Borja RN sent at 10/29/2018  2:08 PM EDT -----      ----- Message -----  From: Rogelio Buenrostro MD  Sent: 10/29/2018   2:04 PM  To: Parris Borja RN    Yes please. Injectafer x 2 doses 750mg each. MD or NP appt with second dose.  ----- Message -----  From: Parris Borja RN  Sent: 10/29/2018   1:38 PM  To: Rogelio Buenrostro MD    Patient has lab results from [facility] with ferritin of 7. Do I need to get her set up for any iron?     
Negative Screen

## 2023-07-06 PROBLEM — E66.9 OBESITY, CLASS II, BMI 35-39.9: Status: ACTIVE | Noted: 2023-07-06

## 2023-07-06 PROBLEM — R10.13 DYSPEPSIA: Status: ACTIVE | Noted: 2023-07-06

## 2023-07-06 PROBLEM — Z98.84 HISTORY OF ROUX-EN-Y GASTRIC BYPASS: Status: ACTIVE | Noted: 2023-07-06

## 2023-07-06 PROBLEM — E66.812 OBESITY, CLASS II, BMI 35-39.9: Status: ACTIVE | Noted: 2023-07-06

## 2023-07-06 PROBLEM — R63.5 UNINTENDED WEIGHT GAIN: Status: ACTIVE | Noted: 2023-07-06

## 2023-07-25 ENCOUNTER — TELEPHONE (OUTPATIENT)
Dept: BARIATRICS/WEIGHT MGMT | Facility: CLINIC | Age: 43
End: 2023-07-25
Payer: COMMERCIAL

## 2023-08-14 RX ORDER — MULTIPLE VITAMINS W/ MINERALS TAB 9MG-400MCG
1 TAB ORAL DAILY
COMMUNITY

## 2023-08-15 ENCOUNTER — ANESTHESIA EVENT (OUTPATIENT)
Dept: GASTROENTEROLOGY | Facility: HOSPITAL | Age: 43
End: 2023-08-15
Payer: COMMERCIAL

## 2023-08-15 ENCOUNTER — ANESTHESIA (OUTPATIENT)
Dept: GASTROENTEROLOGY | Facility: HOSPITAL | Age: 43
End: 2023-08-15
Payer: COMMERCIAL

## 2023-08-15 ENCOUNTER — HOSPITAL ENCOUNTER (OUTPATIENT)
Facility: HOSPITAL | Age: 43
Setting detail: HOSPITAL OUTPATIENT SURGERY
Discharge: HOME | End: 2023-08-15
Attending: SURGERY | Admitting: SURGERY
Payer: COMMERCIAL

## 2023-08-15 VITALS
HEIGHT: 67 IN | HEART RATE: 61 BPM | WEIGHT: 252 LBS | BODY MASS INDEX: 39.55 KG/M2 | DIASTOLIC BLOOD PRESSURE: 68 MMHG | SYSTOLIC BLOOD PRESSURE: 102 MMHG | RESPIRATION RATE: 16 BRPM | OXYGEN SATURATION: 98 %

## 2023-08-15 DIAGNOSIS — Z98.84 HISTORY OF ROUX-EN-Y GASTRIC BYPASS: ICD-10-CM

## 2023-08-15 DIAGNOSIS — E66.9 OBESITY, CLASS II, BMI 35-39.9: ICD-10-CM

## 2023-08-15 DIAGNOSIS — R63.5 UNINTENDED WEIGHT GAIN: ICD-10-CM

## 2023-08-15 DIAGNOSIS — R10.13 DYSPEPSIA: ICD-10-CM

## 2023-08-15 PROCEDURE — 25010000002 PROPOFOL 10 MG/ML EMULSION: Performed by: ANESTHESIOLOGY

## 2023-08-15 PROCEDURE — 87081 CULTURE SCREEN ONLY: CPT | Performed by: SURGERY

## 2023-08-15 RX ORDER — SODIUM CHLORIDE, SODIUM LACTATE, POTASSIUM CHLORIDE, CALCIUM CHLORIDE 600; 310; 30; 20 MG/100ML; MG/100ML; MG/100ML; MG/100ML
30 INJECTION, SOLUTION INTRAVENOUS CONTINUOUS
Status: DISCONTINUED | OUTPATIENT
Start: 2023-08-15 | End: 2023-08-15 | Stop reason: HOSPADM

## 2023-08-15 RX ORDER — PROPOFOL 10 MG/ML
VIAL (ML) INTRAVENOUS AS NEEDED
Status: DISCONTINUED | OUTPATIENT
Start: 2023-08-15 | End: 2023-08-15 | Stop reason: SURG

## 2023-08-15 RX ORDER — SODIUM CHLORIDE 0.9 % (FLUSH) 0.9 %
10 SYRINGE (ML) INJECTION EVERY 12 HOURS SCHEDULED
Status: DISCONTINUED | OUTPATIENT
Start: 2023-08-15 | End: 2023-08-15 | Stop reason: HOSPADM

## 2023-08-15 RX ORDER — SODIUM CHLORIDE 0.9 % (FLUSH) 0.9 %
10 SYRINGE (ML) INJECTION AS NEEDED
Status: DISCONTINUED | OUTPATIENT
Start: 2023-08-15 | End: 2023-08-15 | Stop reason: HOSPADM

## 2023-08-15 RX ORDER — EPHEDRINE SULFATE 50 MG/ML
INJECTION, SOLUTION INTRAVENOUS AS NEEDED
Status: DISCONTINUED | OUTPATIENT
Start: 2023-08-15 | End: 2023-08-15 | Stop reason: SURG

## 2023-08-15 RX ORDER — SODIUM CHLORIDE 9 MG/ML
40 INJECTION, SOLUTION INTRAVENOUS AS NEEDED
Status: DISCONTINUED | OUTPATIENT
Start: 2023-08-15 | End: 2023-08-15 | Stop reason: HOSPADM

## 2023-08-15 RX ORDER — LIDOCAINE HYDROCHLORIDE 20 MG/ML
INJECTION, SOLUTION INFILTRATION; PERINEURAL AS NEEDED
Status: DISCONTINUED | OUTPATIENT
Start: 2023-08-15 | End: 2023-08-15 | Stop reason: SURG

## 2023-08-15 RX ADMIN — LIDOCAINE HYDROCHLORIDE 60 MG: 20 INJECTION, SOLUTION INFILTRATION; PERINEURAL at 10:15

## 2023-08-15 RX ADMIN — PROPOFOL 200 MCG/KG/MIN: 10 INJECTION, EMULSION INTRAVENOUS at 10:15

## 2023-08-15 RX ADMIN — PROPOFOL 200 MG: 10 INJECTION, EMULSION INTRAVENOUS at 10:15

## 2023-08-15 RX ADMIN — SODIUM CHLORIDE, POTASSIUM CHLORIDE, SODIUM LACTATE AND CALCIUM CHLORIDE 30 ML/HR: 600; 310; 30; 20 INJECTION, SOLUTION INTRAVENOUS at 09:55

## 2023-08-15 RX ADMIN — EPHEDRINE SULFATE 10 MG: 50 INJECTION INTRAVENOUS at 10:18

## 2023-08-15 NOTE — ANESTHESIA PREPROCEDURE EVALUATION
Anesthesia Evaluation     Patient summary reviewed and Nursing notes reviewed   history of anesthetic complications:  PONV  NPO Solid Status: > 8 hours  NPO Liquid Status: > 4 hours           Airway   Mallampati: III  TM distance: >3 FB  Neck ROM: full  Possible difficult intubation  Dental - normal exam     Pulmonary - normal exam   (+) asthma,  Cardiovascular - normal exam        Neuro/Psych  (+) headaches, psychiatric history Depression    ROS Comment: Migraines  GI/Hepatic/Renal/Endo    (+) obesity, morbid obesity, GERD, thyroid problem hypothyroidism    ROS Comment: Hx gastric bypass    Musculoskeletal     Abdominal   (+) obese   Substance History      OB/GYN          Other                      Anesthesia Plan    ASA 3     MAC     intravenous induction     Anesthetic plan, risks, benefits, and alternatives have been provided, discussed and informed consent has been obtained with: patient.    CODE STATUS:

## 2023-08-15 NOTE — DISCHARGE INSTRUCTIONS
For the next 24 hours patient needs to be with a responsible adult.    For 24 hours DO NOT drive, operate machinery, appliances, drink alcohol, make important decisions or sign legal documents.    Start with a light or bland diet if you are feeling sick to your stomach otherwise advance to regular diet as tolerated.    Follow recommendations on procedure report if provided by your doctor.    Call Dr Reynaga for problems [phone]    Problems may include but not limited to: large amounts of bleeding, trouble breathing, repeated vomiting, severe unrelieved pain, fever or chills.

## 2023-08-15 NOTE — H&P
[facility] SURGERY  [address] [facility] [address]  [facility] [id number] [phone]  [address] [id number] [phone]  Dept: [phone]  7/6/2023        Gama Andrade.  [id number]  [id number]  1980  female             Chief Complaint   Patient presents with    Follow-up       Fup RNY, discuss revision         BH Post-Op Bariatric Surgery:   Gama Andrade is status post Laparoscopic RNY Bypass procedure, performed on 2010      HPI:   Today's weight is 114 kg (252 lb) pounds, today's BMI is Body mass index is 39.46 kg/mý.,  Initial weight was 315 pounds and lowest weight 170 pounds.  Patient states that started gaining weight after her first pregnancy February 2019 after doing infertility medications.  And then also gained some weight with her second pregnancy a year ago.  Patient with her daily routine with her diet and exercise.  Gama Andrade denies nausea vomiting fever chills chest pain shortness of air abdominal pain and reports weight regain     Diet and Exercise: Diet history reviewed and discussed with the patient. Weight loss/gains to date discussed with the patient. The patient states they are eating around unknown grams of protein per day. She reports eating 3 meals per day, a typical portion size of 1-2 cup, eating 2 snacks per day, drinking 5 or more 8-oz. glasses of water per day, no carbonated beverage consumption and exercising regularly.      Supplements: Bariatric per gastric bypass.      Review of Systems   Constitutional:  Positive for fatigue.   Musculoskeletal:  Positive for arthralgias.   All other systems reviewed and are negative.       * No active hospital problems. *        Medical History        Past Medical History:   Diagnosis Date    Anemia      Arachnoid cyst      Asthma      Depression      Herpes        history of herpes, placed on Valtrex    Hypothyroidism      Migraine      Vitamin  D deficiency              The following portions of the patient's history were reviewed and updated as appropriate: allergies, current medications, past family history, past medical history, past social history, past surgical history, and problem list.         Vitals:     07/06/23 1503   BP: 130/90   Pulse: 82   Temp: 97.5 øF (36.4 øC)         Physical Exam  Vitals reviewed.   HENT:      Head: Normocephalic and atraumatic.      Mouth/Throat:      Mouth: Mucous membranes are moist.      Pharynx: Oropharynx is clear.   Eyes:      General: No scleral icterus.     Extraocular Movements: Extraocular movements intact.      Conjunctiva/sclera: Conjunctivae normal.      Pupils: Pupils are equal, round, and reactive to light.   Neck:      Thyroid: No thyromegaly.   Cardiovascular:      Rate and Rhythm: Normal rate.   Pulmonary:      Effort: Pulmonary effort is normal. No respiratory distress.      Breath sounds: Normal breath sounds. No stridor. No wheezing or rhonchi.   Abdominal:      General: Bowel sounds are normal.      Palpations: Abdomen is soft.      Tenderness: There is no abdominal tenderness. There is no right CVA tenderness, left CVA tenderness, guarding or rebound.      Hernia: No hernia is present.   Musculoskeletal:         General: Normal range of motion.      Cervical back: Normal range of motion and neck supple.   Lymphadenopathy:      Cervical: No cervical adenopathy.   Skin:     General: Skin is warm and dry.      Findings: No erythema.   Neurological:      Mental Status: She is alert and oriented to person, place, and time.   Psychiatric:         Mood and Affect: Mood normal.         Behavior: Behavior normal.         Thought Content: Thought content normal.         Judgment: Judgment normal.         Assessment:   Post-op, the patient status post laparoscopic Naun-en-Y gastric bypass 2010 who did very well get down to 270 pounds.  She has gained some weight back recently with her pregnancies where she  was doing fertility drugs.  Had a long discussion regarding clean eating concentrating on lean protein and vegetables and also discussed exercise routine.  Also discussed weight loss medications and revisional surgery.  We will proceed with upper endoscopy to evaluate her gastric pouch and stoma for measurements.  She does have some dyspepsia at times.  Also discussed doing the meal placement shakes to reset her tool.  She continue with her vitamin regimen.  Does have a history of iron deficiency anemia has had iron infusions in the past and does see hematology at [facility]..  The risks and benefits of the procedure were discussed with the patient in detail and all questions were answered.  Possibility of perforation, bleeding, aspiration, anoxic brain injury, respiratory and/or cardiac arrest and death were discussed.  Consent will be signed and witnessed.          Encounter Diagnoses   Name Primary?    Obesity, Class II, BMI 35-39.9 Yes    History of Naun-en-Y gastric bypass      Dyspepsia      Unintended weight gain           Plan:      Encouraged patient to be sure to get plenty of lean protein per day through small meals all with a protein source.   Activity restrictions: none.   Recommended patient be sure to get at least 70 grams of protein per day by eating small  meals all with high lean protein choices. Be sure to limit/cut back on daily carbohydrate intake. Discussed with the patient the recommended amount of water per day to intake- half of body weight in ounces. Reviewed vitamin requirements. Be sure to do routine exercise, 150 minutes per week minimum, including both cardio and strength training.      Instructions / Recommendations: dietary counseling recommended, recommended a daily protein intake of  grams, vitamin supplement(s) recommended, recommended exercising at least 150 minutes per week, behavior modifications recommended and instructed to call the office for concerns, questions, or  problems.      The patient was instructed to follow up in 1 month.      The patient was counseled regarding the above procedure. Total time spent on this encounter was  35 minutes including reviewing previous notes, lab results and face to face time spent with the patient.  The majority of time was spent counseling the patient regarding diet and exercise as well as reviewing their medications and their compliance with the procedure.

## 2023-08-15 NOTE — OP NOTE
Surgeon: Kaden Reynaga Jr., M.D.    Preoperative Diagnosis: #1 dyspepsia #2 history of Naun-en-Y gastric bypass #3 weight regain    Postoperative Diagnosis: Enlarged stoma    Procedure Performed: Transoral esophagogastroduodenoscopy with gastric pouch biopsies    Indications: 43-year-old female status post laparoscopic Naun-en-Y gastric bypass with some weight regain.     Procedure:     The procedure, indications, preparation and potential complications were explained to the patient, who indicated understanding and signed the corresponding consent forms.  The patient was identified, taken to the endoscopy suite, and placed on the left side down decubitus position.  The patient underwent a MAC anesthesia and was appropriately monitored through the case by the anesthesia personnel using continuous pulse oximetry, blood pressure and cardiac monitoring.  A bite block was placed.  After adequate IV sedation and using a transoral technique a lubed flexible endoscope was placed in the hypopharynx and advanced to the gastric pouch without difficulty.  The pouch was inspected and discussed below.  The scope was advanced through the stoma into the Naun limb.  In the Naun limb there was no bile staining or bowel dilatation and no abnormalities seen unless listed below.  Scope was then withdrawn back into the gastric pouch and cold forcep biopsies were obtained to rule out Helicobacter pylori if dictated below.  No active bleeding was noted.  The scope was then withdrawn back into the esophagus after decompressing the gastric pouch.  The Z line was noted and GE junction measured from the incisors.  The scope was then completely withdrawn.  The patient tolerated the procedure well and left the endoscopy suite in stable condition.  The findings are listed below.    Stoma: Enlarged over 2 and half centimeters  Pouch: Normal small size  Esophagus: Z-line regular, no erosive esophagitis    Recommendations:     Follow-up in the  office as scheduled.

## 2023-08-15 NOTE — ANESTHESIA POSTPROCEDURE EVALUATION
Patient: Gama Andrade    Procedure Summary       Date: 08/15/23 Room / Location:  [facility]    Anesthesia Start: 1012 Anesthesia Stop: 1024    Procedure: ESOPHAGOGASTRODUODENOSCOPY WITH BIOPSY (Esophagus) Diagnosis:       Obesity, Class II, BMI 35-39.9      History of Naun-en-Y gastric bypass      Dyspepsia      Unintended weight gain      (Obesity, Class II, BMI 35-39.9 [E66.9])      (History of Naun-en-Y gastric bypass [Z98.84])      (Dyspepsia [R10.13])      (Unintended weight gain [R63.5])    Surgeons: Kaden Reynaga Jr., MD Provider: Gentry Coles MD    Anesthesia Type: MAC ASA Status: 3            Anesthesia Type: MAC    Vitals  Vitals Value Taken Time   /68 08/15/23 1044   Temp     Pulse 61 08/15/23 1044   Resp 16 08/15/23 1044   SpO2 98 % 08/15/23 1044           Post Anesthesia Care and Evaluation    Patient location during evaluation: PHASE II  Patient participation: complete - patient participated  Level of consciousness: awake and alert  Pain management: adequate    Airway patency: patent  Anesthetic complications: No anesthetic complications  PONV Status: none  Cardiovascular status: acceptable and hemodynamically stable  Respiratory status: acceptable, nonlabored ventilation and spontaneous ventilation  Hydration status: acceptable

## 2023-08-16 LAB — UREASE TISS QL: NEGATIVE

## 2023-08-21 DIAGNOSIS — E66.9 OBESITY, CLASS II, BMI 35-39.9: Primary | ICD-10-CM

## 2023-10-18 ENCOUNTER — LAB (OUTPATIENT)
Dept: LAB | Facility: HOSPITAL | Age: 43
End: 2023-10-18
Payer: COMMERCIAL

## 2023-10-18 DIAGNOSIS — E66.9 OBESITY, CLASS II, BMI 35-39.9: ICD-10-CM

## 2023-10-18 LAB
25(OH)D3 SERPL-MCNC: 21 NG/ML (ref 30–100)
ALBUMIN SERPL-MCNC: 4.2 G/DL (ref 3.5–5.2)
ALBUMIN/GLOB SERPL: 1.8 G/DL
ALP SERPL-CCNC: 99 U/L (ref 39–117)
ALT SERPL W P-5'-P-CCNC: 14 U/L (ref 1–33)
ANION GAP SERPL CALCULATED.3IONS-SCNC: 8 MMOL/L (ref 5–15)
AST SERPL-CCNC: 15 U/L (ref 1–32)
BASOPHILS # BLD AUTO: 0.04 10*3/MM3 (ref 0–0.2)
BASOPHILS NFR BLD AUTO: 0.6 % (ref 0–1.5)
BILIRUB SERPL-MCNC: 0.2 MG/DL (ref 0–1.2)
BUN SERPL-MCNC: 12 MG/DL (ref 6–20)
BUN/CREAT SERPL: 13.6 (ref 7–25)
CALCIUM SPEC-SCNC: 8.8 MG/DL (ref 8.6–10.5)
CHLORIDE SERPL-SCNC: 105 MMOL/L (ref 98–107)
CO2 SERPL-SCNC: 26 MMOL/L (ref 22–29)
CREAT SERPL-MCNC: 0.88 MG/DL (ref 0.57–1)
DEPRECATED RDW RBC AUTO: 38.6 FL (ref 37–54)
EGFRCR SERPLBLD CKD-EPI 2021: 83.7 ML/MIN/1.73
EOSINOPHIL # BLD AUTO: 0.11 10*3/MM3 (ref 0–0.4)
EOSINOPHIL NFR BLD AUTO: 1.7 % (ref 0.3–6.2)
ERYTHROCYTE [DISTWIDTH] IN BLOOD BY AUTOMATED COUNT: 12 % (ref 12.3–15.4)
FERRITIN SERPL-MCNC: 27.1 NG/ML (ref 13–150)
FOLATE SERPL-MCNC: 9.17 NG/ML (ref 4.78–24.2)
GLOBULIN UR ELPH-MCNC: 2.3 GM/DL
GLUCOSE SERPL-MCNC: 178 MG/DL (ref 65–99)
HBA1C MFR BLD: 5.6 % (ref 4.8–5.6)
HCT VFR BLD AUTO: 39.2 % (ref 34–46.6)
HGB BLD-MCNC: 13.1 G/DL (ref 12–15.9)
IMM GRANULOCYTES # BLD AUTO: 0.03 10*3/MM3 (ref 0–0.05)
IMM GRANULOCYTES NFR BLD AUTO: 0.5 % (ref 0–0.5)
IRON 24H UR-MRATE: 52 MCG/DL (ref 37–145)
LYMPHOCYTES # BLD AUTO: 1.64 10*3/MM3 (ref 0.7–3.1)
LYMPHOCYTES NFR BLD AUTO: 24.9 % (ref 19.6–45.3)
MAGNESIUM SERPL-MCNC: 2.2 MG/DL (ref 1.6–2.6)
MCH RBC QN AUTO: 29.4 PG (ref 26.6–33)
MCHC RBC AUTO-ENTMCNC: 33.4 G/DL (ref 31.5–35.7)
MCV RBC AUTO: 87.9 FL (ref 79–97)
MONOCYTES # BLD AUTO: 0.27 10*3/MM3 (ref 0.1–0.9)
MONOCYTES NFR BLD AUTO: 4.1 % (ref 5–12)
NEUTROPHILS NFR BLD AUTO: 4.49 10*3/MM3 (ref 1.7–7)
NEUTROPHILS NFR BLD AUTO: 68.2 % (ref 42.7–76)
NRBC BLD AUTO-RTO: 0 /100 WBC (ref 0–0.2)
PHOSPHATE SERPL-MCNC: 4 MG/DL (ref 2.5–4.5)
PLATELET # BLD AUTO: 302 10*3/MM3 (ref 140–450)
PMV BLD AUTO: 10.4 FL (ref 6–12)
POTASSIUM SERPL-SCNC: 4.3 MMOL/L (ref 3.5–5.2)
PROT SERPL-MCNC: 6.5 G/DL (ref 6–8.5)
PTH-INTACT SERPL-MCNC: 61.2 PG/ML (ref 15–65)
RBC # BLD AUTO: 4.46 10*6/MM3 (ref 3.77–5.28)
SODIUM SERPL-SCNC: 139 MMOL/L (ref 136–145)
WBC NRBC COR # BLD: 6.58 10*3/MM3 (ref 3.4–10.8)

## 2023-10-18 PROCEDURE — 83036 HEMOGLOBIN GLYCOSYLATED A1C: CPT

## 2023-10-18 PROCEDURE — 84100 ASSAY OF PHOSPHORUS: CPT

## 2023-10-18 PROCEDURE — 82728 ASSAY OF FERRITIN: CPT

## 2023-10-18 PROCEDURE — 36415 COLL VENOUS BLD VENIPUNCTURE: CPT

## 2023-10-18 PROCEDURE — 83735 ASSAY OF MAGNESIUM: CPT

## 2023-10-18 PROCEDURE — 82746 ASSAY OF FOLIC ACID SERUM: CPT

## 2023-10-18 PROCEDURE — 83540 ASSAY OF IRON: CPT

## 2023-10-18 PROCEDURE — 85025 COMPLETE CBC W/AUTO DIFF WBC: CPT

## 2023-10-18 PROCEDURE — 80053 COMPREHEN METABOLIC PANEL: CPT

## 2023-10-18 PROCEDURE — 82306 VITAMIN D 25 HYDROXY: CPT

## 2023-10-18 PROCEDURE — 83921 ORGANIC ACID SINGLE QUANT: CPT

## 2023-10-18 PROCEDURE — 84425 ASSAY OF VITAMIN B-1: CPT

## 2023-10-18 PROCEDURE — 83970 ASSAY OF PARATHORMONE: CPT

## 2023-10-22 LAB
METHYLMALONATE SERPL-SCNC: 341 NMOL/L (ref 0–378)
VIT B1 BLD-SCNC: 122 NMOL/L (ref 66.5–200)

## 2023-10-25 RX ORDER — ERGOCALCIFEROL 1.25 MG/1
50000 CAPSULE ORAL
Qty: 12 CAPSULE | Refills: 0 | Status: SHIPPED | OUTPATIENT
Start: 2023-10-25 | End: 2024-01-11

## 2024-04-24 ENCOUNTER — TRANSCRIBE ORDERS (OUTPATIENT)
Dept: LAB | Facility: HOSPITAL | Age: 44
End: 2024-04-24
Payer: COMMERCIAL

## 2024-04-24 ENCOUNTER — LAB (OUTPATIENT)
Dept: LAB | Facility: HOSPITAL | Age: 44
End: 2024-04-24
Payer: COMMERCIAL

## 2024-04-24 DIAGNOSIS — I10 ESSENTIAL HYPERTENSION, MALIGNANT: ICD-10-CM

## 2024-04-24 DIAGNOSIS — E61.1 IRON DEFICIENCY: ICD-10-CM

## 2024-04-24 DIAGNOSIS — E61.1 IRON DEFICIENCY: Primary | ICD-10-CM

## 2024-04-24 DIAGNOSIS — G44.001 INTRACTABLE CLUSTER HEADACHE: ICD-10-CM

## 2024-04-24 LAB
BASOPHILS # BLD AUTO: 0.05 10*3/MM3 (ref 0–0.2)
BASOPHILS NFR BLD AUTO: 0.7 % (ref 0–1.5)
DEPRECATED RDW RBC AUTO: 38.8 FL (ref 37–54)
EOSINOPHIL # BLD AUTO: 0.15 10*3/MM3 (ref 0–0.4)
EOSINOPHIL NFR BLD AUTO: 2 % (ref 0.3–6.2)
ERYTHROCYTE [DISTWIDTH] IN BLOOD BY AUTOMATED COUNT: 11.9 % (ref 12.3–15.4)
FERRITIN SERPL-MCNC: 18 NG/ML (ref 13–150)
HCT VFR BLD AUTO: 37.7 % (ref 34–46.6)
HGB BLD-MCNC: 12.1 G/DL (ref 12–15.9)
IMM GRANULOCYTES # BLD AUTO: 0.03 10*3/MM3 (ref 0–0.05)
IMM GRANULOCYTES NFR BLD AUTO: 0.4 % (ref 0–0.5)
LYMPHOCYTES # BLD AUTO: 1.77 10*3/MM3 (ref 0.7–3.1)
LYMPHOCYTES NFR BLD AUTO: 23.3 % (ref 19.6–45.3)
MCH RBC QN AUTO: 28.1 PG (ref 26.6–33)
MCHC RBC AUTO-ENTMCNC: 32.1 G/DL (ref 31.5–35.7)
MCV RBC AUTO: 87.7 FL (ref 79–97)
MONOCYTES # BLD AUTO: 0.46 10*3/MM3 (ref 0.1–0.9)
MONOCYTES NFR BLD AUTO: 6.1 % (ref 5–12)
NEUTROPHILS NFR BLD AUTO: 5.14 10*3/MM3 (ref 1.7–7)
NEUTROPHILS NFR BLD AUTO: 67.5 % (ref 42.7–76)
NRBC BLD AUTO-RTO: 0 /100 WBC (ref 0–0.2)
PLATELET # BLD AUTO: 350 10*3/MM3 (ref 140–450)
PMV BLD AUTO: 10.2 FL (ref 6–12)
RBC # BLD AUTO: 4.3 10*6/MM3 (ref 3.77–5.28)
WBC NRBC COR # BLD AUTO: 7.6 10*3/MM3 (ref 3.4–10.8)

## 2024-04-24 PROCEDURE — 82728 ASSAY OF FERRITIN: CPT

## 2024-04-24 PROCEDURE — 36415 COLL VENOUS BLD VENIPUNCTURE: CPT

## 2024-04-24 PROCEDURE — 85025 COMPLETE CBC W/AUTO DIFF WBC: CPT

## 2024-04-29 DIAGNOSIS — K95.89 IRON DEFICIENCY ANEMIA FOLLOWING BARIATRIC SURGERY: Primary | ICD-10-CM

## 2024-04-29 DIAGNOSIS — D50.8 IRON DEFICIENCY ANEMIA FOLLOWING BARIATRIC SURGERY: Primary | ICD-10-CM

## 2024-05-03 ENCOUNTER — LAB (OUTPATIENT)
Dept: LAB | Facility: HOSPITAL | Age: 44
End: 2024-05-03
Payer: COMMERCIAL

## 2024-05-03 DIAGNOSIS — D50.8 IRON DEFICIENCY ANEMIA FOLLOWING BARIATRIC SURGERY: ICD-10-CM

## 2024-05-03 DIAGNOSIS — K95.89 IRON DEFICIENCY ANEMIA FOLLOWING BARIATRIC SURGERY: ICD-10-CM

## 2024-05-03 LAB
BASOPHILS # BLD AUTO: 0.04 10*3/MM3 (ref 0–0.2)
BASOPHILS NFR BLD AUTO: 0.5 % (ref 0–1.5)
DEPRECATED RDW RBC AUTO: 38.6 FL (ref 37–54)
EOSINOPHIL # BLD AUTO: 0.18 10*3/MM3 (ref 0–0.4)
EOSINOPHIL NFR BLD AUTO: 2.4 % (ref 0.3–6.2)
ERYTHROCYTE [DISTWIDTH] IN BLOOD BY AUTOMATED COUNT: 12 % (ref 12.3–15.4)
FERRITIN SERPL-MCNC: 21.5 NG/ML (ref 13–150)
HCT VFR BLD AUTO: 37 % (ref 34–46.6)
HGB BLD-MCNC: 12 G/DL (ref 12–15.9)
HGB RETIC QN AUTO: 29.2 PG (ref 29.8–36.1)
IMM GRANULOCYTES # BLD AUTO: 0.02 10*3/MM3 (ref 0–0.05)
IMM GRANULOCYTES NFR BLD AUTO: 0.3 % (ref 0–0.5)
IMM RETICS NFR: 13.5 % (ref 3–15.8)
IRON 24H UR-MRATE: 48 MCG/DL (ref 37–145)
IRON SATN MFR SERPL: 9 % (ref 20–50)
LYMPHOCYTES # BLD AUTO: 1.96 10*3/MM3 (ref 0.7–3.1)
LYMPHOCYTES NFR BLD AUTO: 26.3 % (ref 19.6–45.3)
MCH RBC QN AUTO: 28.6 PG (ref 26.6–33)
MCHC RBC AUTO-ENTMCNC: 32.4 G/DL (ref 31.5–35.7)
MCV RBC AUTO: 88.3 FL (ref 79–97)
MONOCYTES # BLD AUTO: 0.43 10*3/MM3 (ref 0.1–0.9)
MONOCYTES NFR BLD AUTO: 5.8 % (ref 5–12)
NEUTROPHILS NFR BLD AUTO: 4.81 10*3/MM3 (ref 1.7–7)
NEUTROPHILS NFR BLD AUTO: 64.7 % (ref 42.7–76)
NRBC BLD AUTO-RTO: 0 /100 WBC (ref 0–0.2)
PLATELET # BLD AUTO: 320 10*3/MM3 (ref 140–450)
PMV BLD AUTO: 10 FL (ref 6–12)
RBC # BLD AUTO: 4.19 10*6/MM3 (ref 3.77–5.28)
RETICS # AUTO: 0.09 10*6/MM3 (ref 0.02–0.13)
RETICS/RBC NFR AUTO: 2.16 % (ref 0.7–1.9)
TIBC SERPL-MCNC: 511 MCG/DL (ref 298–536)
TRANSFERRIN SERPL-MCNC: 343 MG/DL (ref 200–360)
WBC NRBC COR # BLD AUTO: 7.44 10*3/MM3 (ref 3.4–10.8)

## 2024-05-03 PROCEDURE — 84466 ASSAY OF TRANSFERRIN: CPT

## 2024-05-03 PROCEDURE — 83540 ASSAY OF IRON: CPT

## 2024-05-03 PROCEDURE — 82728 ASSAY OF FERRITIN: CPT

## 2024-05-03 PROCEDURE — 85046 RETICYTE/HGB CONCENTRATE: CPT

## 2024-05-03 PROCEDURE — 85025 COMPLETE CBC W/AUTO DIFF WBC: CPT

## 2024-05-03 PROCEDURE — 36415 COLL VENOUS BLD VENIPUNCTURE: CPT

## 2024-05-06 ENCOUNTER — OFFICE VISIT (OUTPATIENT)
Dept: ONCOLOGY | Facility: CLINIC | Age: 44
End: 2024-05-06
Payer: COMMERCIAL

## 2024-05-06 VITALS
BODY MASS INDEX: 40.6 KG/M2 | TEMPERATURE: 98 F | HEART RATE: 85 BPM | SYSTOLIC BLOOD PRESSURE: 133 MMHG | DIASTOLIC BLOOD PRESSURE: 84 MMHG | WEIGHT: 259.2 LBS | RESPIRATION RATE: 16 BRPM | OXYGEN SATURATION: 100 %

## 2024-05-06 DIAGNOSIS — D50.8 IRON DEFICIENCY ANEMIA FOLLOWING BARIATRIC SURGERY: Primary | ICD-10-CM

## 2024-05-06 DIAGNOSIS — K95.89 IRON DEFICIENCY ANEMIA FOLLOWING BARIATRIC SURGERY: Primary | ICD-10-CM

## 2024-05-06 PROCEDURE — 99213 OFFICE O/P EST LOW 20 MIN: CPT | Performed by: NURSE PRACTITIONER

## 2024-05-09 ENCOUNTER — TELEPHONE (OUTPATIENT)
Dept: ONCOLOGY | Facility: CLINIC | Age: 44
End: 2024-05-09
Payer: COMMERCIAL

## 2024-05-10 ENCOUNTER — TELEPHONE (OUTPATIENT)
Dept: ONCOLOGY | Facility: CLINIC | Age: 44
End: 2024-05-10
Payer: COMMERCIAL

## 2024-05-17 ENCOUNTER — INFUSION (OUTPATIENT)
Dept: ONCOLOGY | Facility: HOSPITAL | Age: 44
End: 2024-05-17
Payer: COMMERCIAL

## 2024-05-17 VITALS
DIASTOLIC BLOOD PRESSURE: 77 MMHG | TEMPERATURE: 97.3 F | HEART RATE: 86 BPM | OXYGEN SATURATION: 100 % | RESPIRATION RATE: 16 BRPM | SYSTOLIC BLOOD PRESSURE: 123 MMHG | BODY MASS INDEX: 40.6 KG/M2 | WEIGHT: 259.2 LBS

## 2024-05-17 DIAGNOSIS — D50.8 IRON DEFICIENCY ANEMIA FOLLOWING BARIATRIC SURGERY: Primary | ICD-10-CM

## 2024-05-17 DIAGNOSIS — K91.2 POSTSURGICAL NONABSORPTION: ICD-10-CM

## 2024-05-17 DIAGNOSIS — K95.89 IRON DEFICIENCY ANEMIA FOLLOWING BARIATRIC SURGERY: Primary | ICD-10-CM

## 2024-05-17 DIAGNOSIS — D50.8 IRON DEFICIENCY ANEMIA SECONDARY TO INADEQUATE DIETARY IRON INTAKE: ICD-10-CM

## 2024-05-17 PROCEDURE — 25010000002 IRON SUCROSE PER 1 MG: Performed by: INTERNAL MEDICINE

## 2024-05-17 PROCEDURE — 63710000001 DIPHENHYDRAMINE PER 50 MG: Performed by: INTERNAL MEDICINE

## 2024-05-17 PROCEDURE — 25810000003 SODIUM CHLORIDE 0.9 % SOLUTION: Performed by: INTERNAL MEDICINE

## 2024-05-17 PROCEDURE — 96365 THER/PROPH/DIAG IV INF INIT: CPT

## 2024-05-17 RX ORDER — ACETAMINOPHEN 325 MG/1
650 TABLET ORAL ONCE
Status: COMPLETED | OUTPATIENT
Start: 2024-05-17 | End: 2024-05-17

## 2024-05-17 RX ORDER — DIPHENHYDRAMINE HCL 25 MG
25 CAPSULE ORAL ONCE
Status: COMPLETED | OUTPATIENT
Start: 2024-05-17 | End: 2024-05-17

## 2024-05-17 RX ORDER — SODIUM CHLORIDE 9 MG/ML
20 INJECTION, SOLUTION INTRAVENOUS ONCE
Status: COMPLETED | OUTPATIENT
Start: 2024-05-17 | End: 2024-05-17

## 2024-05-17 RX ADMIN — DIPHENHYDRAMINE HYDROCHLORIDE 25 MG: 25 CAPSULE ORAL at 08:35

## 2024-05-17 RX ADMIN — SODIUM CHLORIDE 300 MG: 900 INJECTION, SOLUTION INTRAVENOUS at 09:00

## 2024-05-17 RX ADMIN — ACETAMINOPHEN 650 MG: 325 TABLET ORAL at 08:35

## 2024-05-17 RX ADMIN — SODIUM CHLORIDE 20 ML/HR: 9 INJECTION, SOLUTION INTRAVENOUS at 08:45

## 2024-05-24 ENCOUNTER — INFUSION (OUTPATIENT)
Dept: ONCOLOGY | Facility: HOSPITAL | Age: 44
End: 2024-05-24
Payer: COMMERCIAL

## 2024-05-24 VITALS
SYSTOLIC BLOOD PRESSURE: 122 MMHG | TEMPERATURE: 97.1 F | HEART RATE: 70 BPM | RESPIRATION RATE: 18 BRPM | OXYGEN SATURATION: 100 % | BODY MASS INDEX: 40.88 KG/M2 | DIASTOLIC BLOOD PRESSURE: 77 MMHG | WEIGHT: 261 LBS

## 2024-05-24 DIAGNOSIS — K95.89 IRON DEFICIENCY ANEMIA FOLLOWING BARIATRIC SURGERY: Primary | ICD-10-CM

## 2024-05-24 DIAGNOSIS — D50.8 IRON DEFICIENCY ANEMIA FOLLOWING BARIATRIC SURGERY: Primary | ICD-10-CM

## 2024-05-24 DIAGNOSIS — K91.2 POSTSURGICAL NONABSORPTION: ICD-10-CM

## 2024-05-24 DIAGNOSIS — D50.8 IRON DEFICIENCY ANEMIA SECONDARY TO INADEQUATE DIETARY IRON INTAKE: ICD-10-CM

## 2024-05-24 PROCEDURE — 63710000001 DIPHENHYDRAMINE PER 50 MG: Performed by: NURSE PRACTITIONER

## 2024-05-24 PROCEDURE — 25810000003 SODIUM CHLORIDE 0.9 % SOLUTION: Performed by: NURSE PRACTITIONER

## 2024-05-24 PROCEDURE — 96365 THER/PROPH/DIAG IV INF INIT: CPT

## 2024-05-24 PROCEDURE — 25010000002 IRON SUCROSE PER 1 MG: Performed by: NURSE PRACTITIONER

## 2024-05-24 RX ORDER — DIPHENHYDRAMINE HCL 25 MG
25 CAPSULE ORAL ONCE
Status: COMPLETED | OUTPATIENT
Start: 2024-05-24 | End: 2024-05-24

## 2024-05-24 RX ORDER — SODIUM CHLORIDE 9 MG/ML
20 INJECTION, SOLUTION INTRAVENOUS ONCE
Status: COMPLETED | OUTPATIENT
Start: 2024-05-24 | End: 2024-05-24

## 2024-05-24 RX ORDER — ACETAMINOPHEN 325 MG/1
650 TABLET ORAL ONCE
Status: COMPLETED | OUTPATIENT
Start: 2024-05-24 | End: 2024-05-24

## 2024-05-24 RX ADMIN — SODIUM CHLORIDE 300 MG: 900 INJECTION, SOLUTION INTRAVENOUS at 09:07

## 2024-05-24 RX ADMIN — ACETAMINOPHEN 650 MG: 325 TABLET ORAL at 08:34

## 2024-05-24 RX ADMIN — DIPHENHYDRAMINE HYDROCHLORIDE 25 MG: 25 CAPSULE ORAL at 08:34

## 2024-05-24 RX ADMIN — SODIUM CHLORIDE 20 ML/HR: 9 INJECTION, SOLUTION INTRAVENOUS at 08:40

## 2024-05-31 ENCOUNTER — INFUSION (OUTPATIENT)
Dept: ONCOLOGY | Facility: HOSPITAL | Age: 44
End: 2024-05-31
Payer: COMMERCIAL

## 2024-05-31 VITALS
BODY MASS INDEX: 40.85 KG/M2 | RESPIRATION RATE: 16 BRPM | WEIGHT: 260.8 LBS | SYSTOLIC BLOOD PRESSURE: 142 MMHG | TEMPERATURE: 97.7 F | HEART RATE: 85 BPM | DIASTOLIC BLOOD PRESSURE: 81 MMHG | OXYGEN SATURATION: 100 %

## 2024-05-31 DIAGNOSIS — K95.89 IRON DEFICIENCY ANEMIA FOLLOWING BARIATRIC SURGERY: Primary | ICD-10-CM

## 2024-05-31 DIAGNOSIS — D50.8 IRON DEFICIENCY ANEMIA FOLLOWING BARIATRIC SURGERY: Primary | ICD-10-CM

## 2024-05-31 DIAGNOSIS — K91.2 POSTSURGICAL NONABSORPTION: ICD-10-CM

## 2024-05-31 DIAGNOSIS — D50.8 IRON DEFICIENCY ANEMIA SECONDARY TO INADEQUATE DIETARY IRON INTAKE: ICD-10-CM

## 2024-05-31 PROCEDURE — 25010000002 IRON SUCROSE PER 1 MG: Performed by: NURSE PRACTITIONER

## 2024-05-31 PROCEDURE — 25810000003 SODIUM CHLORIDE 0.9 % SOLUTION: Performed by: NURSE PRACTITIONER

## 2024-05-31 PROCEDURE — 63710000001 DIPHENHYDRAMINE PER 50 MG: Performed by: NURSE PRACTITIONER

## 2024-05-31 PROCEDURE — 96365 THER/PROPH/DIAG IV INF INIT: CPT

## 2024-05-31 RX ORDER — DIPHENHYDRAMINE HCL 25 MG
25 CAPSULE ORAL ONCE
Status: COMPLETED | OUTPATIENT
Start: 2024-05-31 | End: 2024-05-31

## 2024-05-31 RX ORDER — ACETAMINOPHEN 325 MG/1
650 TABLET ORAL ONCE
Status: COMPLETED | OUTPATIENT
Start: 2024-05-31 | End: 2024-05-31

## 2024-05-31 RX ORDER — SODIUM CHLORIDE 9 MG/ML
20 INJECTION, SOLUTION INTRAVENOUS ONCE
Status: COMPLETED | OUTPATIENT
Start: 2024-05-31 | End: 2024-05-31

## 2024-05-31 RX ADMIN — DIPHENHYDRAMINE HYDROCHLORIDE 25 MG: 25 CAPSULE ORAL at 08:20

## 2024-05-31 RX ADMIN — SODIUM CHLORIDE 20 ML/HR: 9 INJECTION, SOLUTION INTRAVENOUS at 08:47

## 2024-05-31 RX ADMIN — ACETAMINOPHEN 650 MG: 325 TABLET ORAL at 08:20

## 2024-05-31 RX ADMIN — SODIUM CHLORIDE 300 MG: 900 INJECTION, SOLUTION INTRAVENOUS at 08:47

## 2024-10-20 ENCOUNTER — HOSPITAL ENCOUNTER (EMERGENCY)
Facility: HOSPITAL | Age: 44
Discharge: HOME | End: 2024-10-20
Attending: EMERGENCY MEDICINE | Admitting: EMERGENCY MEDICINE
Payer: COMMERCIAL

## 2024-10-20 VITALS
RESPIRATION RATE: 16 BRPM | HEART RATE: 73 BPM | TEMPERATURE: 99.9 F | WEIGHT: 254.2 LBS | HEIGHT: 67 IN | OXYGEN SATURATION: 96 % | BODY MASS INDEX: 39.9 KG/M2 | DIASTOLIC BLOOD PRESSURE: 69 MMHG | SYSTOLIC BLOOD PRESSURE: 119 MMHG

## 2024-10-20 DIAGNOSIS — J02.9 PHARYNGITIS, UNSPECIFIED ETIOLOGY: ICD-10-CM

## 2024-10-20 DIAGNOSIS — G43.909 MIGRAINE WITHOUT STATUS MIGRAINOSUS, NOT INTRACTABLE, UNSPECIFIED MIGRAINE TYPE: Primary | ICD-10-CM

## 2024-10-20 LAB
ANION GAP SERPL CALCULATED.3IONS-SCNC: 10.9 MMOL/L (ref 5–15)
B PARAPERT DNA SPEC QL NAA+PROBE: NOT DETECTED
B PERT DNA SPEC QL NAA+PROBE: NOT DETECTED
BASOPHILS # BLD AUTO: 0.04 10*3/MM3 (ref 0–0.2)
BASOPHILS NFR BLD AUTO: 0.5 % (ref 0–1.5)
BUN SERPL-MCNC: 12 MG/DL (ref 6–20)
BUN/CREAT SERPL: 15.6 (ref 7–25)
C PNEUM DNA NPH QL NAA+NON-PROBE: NOT DETECTED
CALCIUM SPEC-SCNC: 8.6 MG/DL (ref 8.6–10.5)
CHLORIDE SERPL-SCNC: 103 MMOL/L (ref 98–107)
CO2 SERPL-SCNC: 23.1 MMOL/L (ref 22–29)
CREAT SERPL-MCNC: 0.77 MG/DL (ref 0.57–1)
DEPRECATED RDW RBC AUTO: 40.9 FL (ref 37–54)
EGFRCR SERPLBLD CKD-EPI 2021: 97.7 ML/MIN/1.73
EOSINOPHIL # BLD AUTO: 0.07 10*3/MM3 (ref 0–0.4)
EOSINOPHIL NFR BLD AUTO: 0.9 % (ref 0.3–6.2)
ERYTHROCYTE [DISTWIDTH] IN BLOOD BY AUTOMATED COUNT: 12.2 % (ref 12.3–15.4)
FLUAV SUBTYP SPEC NAA+PROBE: NOT DETECTED
FLUBV RNA ISLT QL NAA+PROBE: NOT DETECTED
GLUCOSE SERPL-MCNC: 87 MG/DL (ref 65–99)
HADV DNA SPEC NAA+PROBE: NOT DETECTED
HCOV 229E RNA SPEC QL NAA+PROBE: NOT DETECTED
HCOV HKU1 RNA SPEC QL NAA+PROBE: NOT DETECTED
HCOV NL63 RNA SPEC QL NAA+PROBE: NOT DETECTED
HCOV OC43 RNA SPEC QL NAA+PROBE: NOT DETECTED
HCT VFR BLD AUTO: 38.1 % (ref 34–46.6)
HGB BLD-MCNC: 12.6 G/DL (ref 12–15.9)
HMPV RNA NPH QL NAA+NON-PROBE: NOT DETECTED
HPIV1 RNA ISLT QL NAA+PROBE: NOT DETECTED
HPIV2 RNA SPEC QL NAA+PROBE: NOT DETECTED
HPIV3 RNA NPH QL NAA+PROBE: NOT DETECTED
HPIV4 P GENE NPH QL NAA+PROBE: NOT DETECTED
IMM GRANULOCYTES # BLD AUTO: 0.03 10*3/MM3 (ref 0–0.05)
IMM GRANULOCYTES NFR BLD AUTO: 0.4 % (ref 0–0.5)
LYMPHOCYTES # BLD AUTO: 1.63 10*3/MM3 (ref 0.7–3.1)
LYMPHOCYTES NFR BLD AUTO: 22 % (ref 19.6–45.3)
M PNEUMO IGG SER IA-ACNC: NOT DETECTED
MCH RBC QN AUTO: 30.1 PG (ref 26.6–33)
MCHC RBC AUTO-ENTMCNC: 33.1 G/DL (ref 31.5–35.7)
MCV RBC AUTO: 91.1 FL (ref 79–97)
MONOCYTES # BLD AUTO: 0.68 10*3/MM3 (ref 0.1–0.9)
MONOCYTES NFR BLD AUTO: 9.2 % (ref 5–12)
NEUTROPHILS NFR BLD AUTO: 4.97 10*3/MM3 (ref 1.7–7)
NEUTROPHILS NFR BLD AUTO: 67 % (ref 42.7–76)
NRBC BLD AUTO-RTO: 0 /100 WBC (ref 0–0.2)
PLATELET # BLD AUTO: 282 10*3/MM3 (ref 140–450)
PMV BLD AUTO: 9.9 FL (ref 6–12)
POTASSIUM SERPL-SCNC: 3.9 MMOL/L (ref 3.5–5.2)
RBC # BLD AUTO: 4.18 10*6/MM3 (ref 3.77–5.28)
RHINOVIRUS RNA SPEC NAA+PROBE: NOT DETECTED
RSV RNA NPH QL NAA+NON-PROBE: NOT DETECTED
S PYO AG THROAT QL: NEGATIVE
SARS-COV-2 RNA NPH QL NAA+NON-PROBE: NOT DETECTED
SODIUM SERPL-SCNC: 137 MMOL/L (ref 136–145)
WBC NRBC COR # BLD AUTO: 7.42 10*3/MM3 (ref 3.4–10.8)

## 2024-10-20 PROCEDURE — 87651 STREP A DNA AMP PROBE: CPT | Performed by: EMERGENCY MEDICINE

## 2024-10-20 PROCEDURE — 25010000002 DIPHENHYDRAMINE PER 50 MG: Performed by: EMERGENCY MEDICINE

## 2024-10-20 PROCEDURE — 25010000002 METOCLOPRAMIDE PER 10 MG: Performed by: EMERGENCY MEDICINE

## 2024-10-20 PROCEDURE — 96374 THER/PROPH/DIAG INJ IV PUSH: CPT

## 2024-10-20 PROCEDURE — 85025 COMPLETE CBC W/AUTO DIFF WBC: CPT | Performed by: EMERGENCY MEDICINE

## 2024-10-20 PROCEDURE — 80048 BASIC METABOLIC PNL TOTAL CA: CPT | Performed by: EMERGENCY MEDICINE

## 2024-10-20 PROCEDURE — 99283 EMERGENCY DEPT VISIT LOW MDM: CPT

## 2024-10-20 PROCEDURE — 96375 TX/PRO/DX INJ NEW DRUG ADDON: CPT

## 2024-10-20 PROCEDURE — 0202U NFCT DS 22 TRGT SARS-COV-2: CPT | Performed by: EMERGENCY MEDICINE

## 2024-10-20 RX ORDER — DIPHENHYDRAMINE HYDROCHLORIDE 50 MG/ML
12.5 INJECTION INTRAMUSCULAR; INTRAVENOUS ONCE
Status: COMPLETED | OUTPATIENT
Start: 2024-10-20 | End: 2024-10-20

## 2024-10-20 RX ORDER — METOCLOPRAMIDE HYDROCHLORIDE 5 MG/ML
10 INJECTION INTRAMUSCULAR; INTRAVENOUS ONCE
Status: COMPLETED | OUTPATIENT
Start: 2024-10-20 | End: 2024-10-20

## 2024-10-20 RX ORDER — ACETAMINOPHEN 500 MG
1000 TABLET ORAL ONCE
Status: COMPLETED | OUTPATIENT
Start: 2024-10-20 | End: 2024-10-20

## 2024-10-20 RX ORDER — SODIUM CHLORIDE 0.9 % (FLUSH) 0.9 %
10 SYRINGE (ML) INJECTION AS NEEDED
Status: DISCONTINUED | OUTPATIENT
Start: 2024-10-20 | End: 2024-10-21 | Stop reason: HOSPADM

## 2024-10-20 RX ADMIN — ACETAMINOPHEN 1000 MG: 500 TABLET, FILM COATED ORAL at 19:51

## 2024-10-20 RX ADMIN — DIPHENHYDRAMINE HYDROCHLORIDE 12.5 MG: 50 INJECTION, SOLUTION INTRAMUSCULAR; INTRAVENOUS at 19:53

## 2024-10-20 RX ADMIN — METOCLOPRAMIDE 10 MG: 5 INJECTION, SOLUTION INTRAMUSCULAR; INTRAVENOUS at 19:52

## 2024-10-21 NOTE — ED PROVIDER NOTES
Subjective   History of Present Illness  44-year-old female per medical history of migraines, presents for headache.  Been going home for 2 days.  States slow onset.  Started yesterday morning.  Gradually got worse.  Took some ibuprofen and some Ubrelvy that did not help symptoms.  States she is also had a sore throat going on for over 2 weeks.  No congestion.  No cough.  No vomiting or diarrhea.  Denies vision changes.  Denies numbness or weakness in any of her extremities.  Review of Systems  See HPI.  Past Medical History:   Diagnosis Date    Anemia     Arachnoid cyst     Asthma     Depression     GERD (gastroesophageal reflux disease)     Herpes      history of herpes, placed on Valtrex    Hypothyroidism     Migraine     PONV (postoperative nausea and vomiting)     Vitamin D deficiency        Allergies   Allergen Reactions    Eletriptan Palpitations    Sumatriptan Palpitations       Past Surgical History:   Procedure Laterality Date    ANKLE SURGERY Left 2021     SECTION N/A 2019    Procedure:  SECTION PRIMARY;  Surgeon: Aleyda Whitley MD;  Location: [facility] LABOR DELIVERY;  Service: Obstetrics/Gynecology     SECTION N/A 2022    Procedure:  SECTION REPEAT;  Surgeon: Aleyda Whitley MD;  Location: [facility] LABOR DELIVERY;  Service: Obstetrics/Gynecology;  Laterality: N/A;    CHOLECYSTECTOMY  2001    COLONOSCOPY      ENDOSCOPY      ENDOSCOPY N/A 8/15/2023    Procedure: ESOPHAGOGASTRODUODENOSCOPY WITH BIOPSY;  Surgeon: Kaden Reynaga Jr., MD;  Location: [facility] ENDOSCOPY;  Service: General;  Laterality: N/A;  PRE- DYSPEPSIA, H/O BYPASS  POST- SAME    GASTRIC BYPASS  2010    SINUS SURGERY  10/2002    WISDOM TOOTH EXTRACTION         Family History   Problem Relation Age of Onset    Lupus Mother     Obesity Mother     Heart disease Mother     Stroke Father     Heart disease Father     Heart disease Maternal Grandmother     Obesity Maternal Grandfather  "    Heart disease Maternal Grandfather     Malig Hyperthermia Neg Hx        Social History     Socioeconomic History    Marital status:     Years of education: College   Tobacco Use    Smoking status: Never    Smokeless tobacco: Never   Vaping Use    Vaping status: Never Used   Substance and Sexual Activity    Alcohol use: Not Currently    Drug use: No    Sexual activity: Yes           Objective   Physical Exam  No acute distress, regular rate and rhythm, mildly erythematous oropharynx with midline uvula no mass effect no exudate, no tachypnea or increased work of breathing, cranial nerves II through XII intact, normal strength, normal sensation, no cerebellar signs, no drift.  Procedures           ED Course      /69   Pulse 73   Temp 99.9 °F (37.7 °C) (Oral)   Resp 16   Ht 170.2 cm (67\")   Wt 115 kg (254 lb 3.2 oz)   LMP 10/01/2024   SpO2 96%   BMI 39.81 kg/m²   Labs Reviewed   CBC WITH AUTO DIFFERENTIAL - Abnormal; Notable for the following components:       Result Value    RDW 12.2 (*)     All other components within normal limits   RESPIRATORY PANEL PCR W/ COVID-19 (SARS-COV-2), NP SWAB IN UTM/VTP, 2 HR TAT - Normal    Narrative:     In the setting of a positive respiratory panel with a viral infection PLUS a negative procalcitonin without other underlying concern for bacterial infection, consider observing off antibiotics or discontinuation of antibiotics and continue supportive care. If the respiratory panel is positive for atypical bacterial infection (Bordetella pertussis, Chlamydophila pneumoniae, or Mycoplasma pneumoniae), consider antibiotic de-escalation to target atypical bacterial infection.   RAPID STREP A SCREEN - Normal   BASIC METABOLIC PANEL - Normal    Narrative:     GFR Normal >60  Chronic Kidney Disease <60  Kidney Failure <15     CBC AND DIFFERENTIAL    Narrative:     The following orders were created for panel order CBC & Differential.  Procedure                           "     Abnormality         Status                     ---------                               -----------         ------                     CBC Auto Differential[[id number]]        Abnormal            Final result                 Please view results for these tests on the individual orders.     .EEDS  No orders to display                                            Medical Decision Making  Headache improved significantly.  States almost completely resolved.  Labs reassuring.  Patient nontoxic-appearing.  Headache not acute onset.  No indication for emergent imaging at this time.  Discussed further treatment of headache versus discharge home with patient requesting to be discharged home.    Final diagnoses:   Migraine without status migrainosus, not intractable, unspecified migraine type   Pharyngitis, unspecified etiology       ED Disposition  ED Disposition       ED Disposition   Discharge    Condition   Stable    Comment   --               Courtney Urias, APRN  [address] [place] [address]  [phone]    In 3 days           Medication List      No changes were made to your prescriptions during this visit.            Rogelio Aragon MD  10/21/24 0015

## 2025-04-07 NOTE — PROGRESS NOTES
Chief Complaint  Chief Complaint   Patient presents with    Establish Care     New Patient     Weight Loss     Patient states that she has tried diet and exercise. Patient states that she has tried phentermine in the past. Patient states that she would like to try something different. Patient states that she had a gastric bypass back in 2010 and has also tried weight watchers in the past.         Subjective          Gama Andrade is here today to establish care. The following problems were discussed:     Family history:Father- CVA, CABG. Mother- macular degeneration, back surgeries DDD, HTN, Obesity.     Social history: Denies smoking, drinking, or illegal drug use.     Iron defiency anemia- Has history of gastric bypass in 2010. Her system does not absorb iron. Every other year she normally gets iron infusion. She used to see Dr. Kumari/ Dr. Garsia.     Depression- She is on zoloft and wellbutrin and she feels like it is controlled. Denies SI or HI at this time, but reports SI thought in the past. She has seen a counselor in the past.     Migraine-She takes Ubrelvy as need. She reports she gets these 1-2 times a month. The last bad one she had was in October.     Hypothyroidism- Compliant on levothyroxine. She needs her labs checked.     Obesity- She is active, does not have set scheduled exercise. She eats a healthy diet. Had surgery of gastric bypass 2010 and reports she was pregnant in 2019 and gained weight once she  then got pregnant with second child and gained 20 pounds and then gained 35-40 when breastfeeding. She reports Dr. Reynaga did the surgery.     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 She has noticed some tingling. She takes aleve periodically.     Asthma- She was diagnosed as a child. She only has issues if she gets sick. She has an albuterol and rarely uses it.       She is from this area   Previous PCP was Courtney Suarez NP  Marital status-   Children- Yes  Works as  "   Exercise- irregularly  Diet- Eating well-balanced meals          Review of Systems   Constitutional:  Negative for chills and fever.   HENT:  Positive for sore throat. Negative for congestion.    Respiratory:  Negative for shortness of breath.    Cardiovascular:  Negative for chest pain.   Gastrointestinal:  Negative for abdominal pain, nausea and vomiting.   Genitourinary:  Negative for difficulty urinating.   Musculoskeletal:  Negative for myalgias.        Pain in middle finger   Skin: Negative.    Neurological:  Positive for headache. Negative for dizziness and light-headedness.   Psychiatric/Behavioral:  Positive for depressed mood. Negative for self-injury and suicidal ideas. The patient is not nervous/anxious.         Objective   Vital Signs:   Vitals:    04/08/25 1313   BP: 133/80   Pulse: 86   Resp: 14   Temp: 97.8 °F (36.6 °C)   SpO2: 100%      Estimated body mass index is 39.78 kg/m² as calculated from the following:    Height as of this encounter: 170.2 cm (67\").    Weight as of this encounter: 115 kg (254 lb).    Class 2 Severe Obesity (BMI >=35 and <=39.9). Obesity-related health conditions include the following: none. Obesity is worsening. BMI is is above average; BMI management plan is completed. We discussed portion control, increasing exercise, and consulting a Bariatric surgeon.            Patient screened negative for depression based on a PHQ-9 score of 0 on 4/8/2025. Follow-up recommendations include: PCP managing depression.        Physical Exam  Vitals reviewed.   Constitutional:       Appearance: Normal appearance. She is obese.   HENT:      Head: Normocephalic and atraumatic.      Nose: Nose normal.      Mouth/Throat:      Mouth: Mucous membranes are moist.      Pharynx: Oropharynx is clear.   Eyes:      Extraocular Movements: Extraocular movements intact.      Conjunctiva/sclera: Conjunctivae normal.      Pupils: Pupils are equal, round, and reactive to light. "   Cardiovascular:      Rate and Rhythm: Normal rate and regular rhythm.      Pulses: Normal pulses.      Heart sounds: Normal heart sounds.      Comments: S1, S2 audible  Pulmonary:      Effort: Pulmonary effort is normal.      Breath sounds: Normal breath sounds.      Comments: On room air   Abdominal:      General: Abdomen is flat. Bowel sounds are normal.      Palpations: Abdomen is soft.   Musculoskeletal:         General: Normal range of motion.      Cervical back: Normal range of motion.   Skin:     General: Skin is warm and dry.   Neurological:      General: No focal deficit present.      Mental Status: She is alert and oriented to person, place, and time. Mental status is at baseline.   Psychiatric:         Mood and Affect: Mood normal.         Behavior: Behavior normal.         Thought Content: Thought content normal.         Judgment: Judgment normal.                Physical Exam   Result Review :             Procedures       Assessment and Plan     Diagnoses and all orders for this visit:    1. Encounter to establish care (Primary)  Assessment & Plan:  She is from this area   Previous PCP was Courtney Suarez NP  Marital status-   Children- Yes  Works as    Exercise- irregularly  Diet- Eating well-balanced meals     Family history:Father- CVA, CABG. Mother- macular degeneration, back surgeries DDD, HTN, Obesity.     Social history: Denies smoking, drinking, or illegal drug use.       2. Iron deficiency anemia secondary to inadequate dietary iron intake  Assessment & Plan:    Iron defiency anemia- Has history of gastric bypass in 2010. Her system does not absorb iron. Every other year she normally gets iron infusion. She used to see Dr. Kumari/ Dr. Garsia.       3. Moderate episode of recurrent major depressive disorder  Assessment & Plan:    Depression- She is on zoloft and wellbutrin and she feels like it is controlled. Denies SI or HI at this time, but reports SI thought in the  past. She has seen a counselor in the past.       4. Intractable migraine without aura and with status migrainosus  Assessment & Plan:        Migraine-She takes Ubrelvy as need. She reports she gets these 1-2 times a month. The last bad one she had was in October.         5. Hypothyroidism, unspecified type  Assessment & Plan:  Hypothyroidism- Compliant on levothyroxine. She needs her labs checked.     Check TSH and T4     Orders:  -     TSH+Free T4    6. Obesity, Class II, BMI 35-39.9  Assessment & Plan:  Patient's (Body mass index is 39.78 kg/m².)       Obesity- She is active, does not have set scheduled exercise. She eats a healthy diet. Had surgery of gastric bypass 2010 and reports she was pregnant in 2019 and gained weight once she  then got pregnant with second child and gained 20 pounds and then gained 35-40 when breastfeeding. She reports Dr. Reynaga did the surgery.     Encourage follow-up with Bariatric surgery   Referral placed with Dr. Reynaga     Orders:  -     Ambulatory Referral to Bariatric Surgery    7. 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 She has noticed some tingling. She takes aleve periodically.     Consider hand surgery referral if needed- patient declined at this time     Continue aleve at this time       8. Mild intermittent asthma without complication  Assessment & Plan:    Asthma- She was diagnosed as a child. She only has issues if she gets sick. She has an albuterol and rarely uses it.           9. Vitamin D deficiency  -     Vitamin D 25 hydroxy    Other orders  -     albuterol sulfate  (90 Base) MCG/ACT inhaler; Inhale 2 puffs Every 4 (Four) Hours As Needed for Wheezing or Shortness of Air.  Dispense: 6.7 g; Refill: 1              Follow Up   Return for Annual physical.   Patient was given instructions and counseling regarding her condition or for health maintenance advice. Please see specific  information pulled into the AVS if appropriate.

## 2025-04-08 ENCOUNTER — OFFICE VISIT (OUTPATIENT)
Dept: FAMILY MEDICINE CLINIC | Facility: CLINIC | Age: 45
End: 2025-04-08
Payer: COMMERCIAL

## 2025-04-08 ENCOUNTER — TELEPHONE (OUTPATIENT)
Dept: BARIATRICS/WEIGHT MGMT | Facility: CLINIC | Age: 45
End: 2025-04-08
Payer: COMMERCIAL

## 2025-04-08 VITALS
BODY MASS INDEX: 39.87 KG/M2 | WEIGHT: 254 LBS | OXYGEN SATURATION: 100 % | DIASTOLIC BLOOD PRESSURE: 80 MMHG | TEMPERATURE: 97.8 F | SYSTOLIC BLOOD PRESSURE: 133 MMHG | HEIGHT: 67 IN | RESPIRATION RATE: 14 BRPM | HEART RATE: 86 BPM

## 2025-04-08 DIAGNOSIS — F33.1 MODERATE EPISODE OF RECURRENT MAJOR DEPRESSIVE DISORDER: ICD-10-CM

## 2025-04-08 DIAGNOSIS — Z76.89 ENCOUNTER TO ESTABLISH CARE: Primary | ICD-10-CM

## 2025-04-08 DIAGNOSIS — E03.9 HYPOTHYROIDISM, UNSPECIFIED TYPE: ICD-10-CM

## 2025-04-08 DIAGNOSIS — M65.332 TRIGGER MIDDLE FINGER OF LEFT HAND: ICD-10-CM

## 2025-04-08 DIAGNOSIS — Z00.00 ANNUAL PHYSICAL EXAM: ICD-10-CM

## 2025-04-08 DIAGNOSIS — J45.20 MILD INTERMITTENT ASTHMA WITHOUT COMPLICATION: ICD-10-CM

## 2025-04-08 DIAGNOSIS — D50.8 IRON DEFICIENCY ANEMIA SECONDARY TO INADEQUATE DIETARY IRON INTAKE: ICD-10-CM

## 2025-04-08 DIAGNOSIS — E55.9 VITAMIN D DEFICIENCY: ICD-10-CM

## 2025-04-08 DIAGNOSIS — G43.011 INTRACTABLE MIGRAINE WITHOUT AURA AND WITH STATUS MIGRAINOSUS: ICD-10-CM

## 2025-04-08 DIAGNOSIS — E66.812 OBESITY, CLASS II, BMI 35-39.9: ICD-10-CM

## 2025-04-08 PROBLEM — R79.0 LOW FERRITIN LEVEL: Status: RESOLVED | Noted: 2017-01-04 | Resolved: 2025-04-08

## 2025-04-08 PROBLEM — F32.A DEPRESSIVE DISORDER: Status: ACTIVE | Noted: 2025-04-08

## 2025-04-08 PROBLEM — G43.909 MIGRAINE HEADACHE: Status: ACTIVE | Noted: 2025-04-08

## 2025-04-08 PROBLEM — R63.5 UNINTENDED WEIGHT GAIN: Status: RESOLVED | Noted: 2023-07-06 | Resolved: 2025-04-08

## 2025-04-08 PROBLEM — G43.719 INTRACTABLE CHRONIC MIGRAINE WITHOUT AURA: Status: RESOLVED | Noted: 2017-01-04 | Resolved: 2025-04-08

## 2025-04-08 LAB
T4 FREE SERPL-MCNC: 1.11 NG/DL (ref 0.93–1.7)
TSH SERPL DL<=0.05 MIU/L-ACNC: 2.49 UIU/ML (ref 0.27–4.2)

## 2025-04-08 PROCEDURE — 82306 VITAMIN D 25 HYDROXY: CPT | Performed by: NURSE PRACTITIONER

## 2025-04-08 PROCEDURE — 84443 ASSAY THYROID STIM HORMONE: CPT | Performed by: NURSE PRACTITIONER

## 2025-04-08 PROCEDURE — 84439 ASSAY OF FREE THYROXINE: CPT | Performed by: NURSE PRACTITIONER

## 2025-04-08 RX ORDER — BUPROPION HYDROCHLORIDE 100 MG/1
100 TABLET ORAL 3 TIMES DAILY
COMMUNITY
Start: 2015-03-26

## 2025-04-08 RX ORDER — ALBUTEROL SULFATE 90 UG/1
2 INHALANT RESPIRATORY (INHALATION) EVERY 4 HOURS PRN
Qty: 6.7 G | Refills: 1 | Status: SHIPPED | OUTPATIENT
Start: 2025-04-08

## 2025-04-08 NOTE — ASSESSMENT & PLAN NOTE
Depression- She is on zoloft and wellbutrin and she feels like it is controlled. Denies SI or HI at this time, but reports SI thought in the past. She has seen a counselor in the past.

## 2025-04-08 NOTE — PROGRESS NOTES
Venipuncture performed in L ARM by RT Marina  with good hemostasis. Patient tolerated well. 04/08/25 Aliyah Graves, RT

## 2025-04-08 NOTE — ASSESSMENT & PLAN NOTE
Iron defiency anemia- Has history of gastric bypass in 2010. Her system does not absorb iron. Every other year she normally gets iron infusion. She used to see Dr. Kumari/ Dr. Garsia.

## 2025-04-08 NOTE — ASSESSMENT & PLAN NOTE
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 She has noticed some tingling. She takes aleve periodically.     Consider hand surgery referral if needed- patient declined at this time     Continue aleve at this time

## 2025-04-08 NOTE — ASSESSMENT & PLAN NOTE
She is from this area   Previous PCP was Courtney Suarez NP  Marital status-   Children- Yes  Works as    Exercise- irregularly  Diet- Eating well-balanced meals     Family history:Father- CVA, CABG. Mother- macular degeneration, back surgeries DDD, HTN, Obesity.     Social history: Denies smoking, drinking, or illegal drug use.

## 2025-04-08 NOTE — ASSESSMENT & PLAN NOTE
Migraine-She takes Ubrelvy as need. She reports she gets these 1-2 times a month. The last bad one she had was in October.

## 2025-04-08 NOTE — ASSESSMENT & PLAN NOTE
Asthma- She was diagnosed as a child. She only has issues if she gets sick. She has an albuterol and rarely uses it.

## 2025-04-08 NOTE — ASSESSMENT & PLAN NOTE
Patient's (Body mass index is 39.78 kg/m².)       Obesity- She is active, does not have set scheduled exercise. She eats a healthy diet. Had surgery of gastric bypass 2010 and reports she was pregnant in 2019 and gained weight once she  then got pregnant with second child and gained 20 pounds and then gained 35-40 when breastfeeding. She reports Dr. Reynaga did the surgery.     Encourage follow-up with Bariatric surgery   Referral placed with Dr. Reynaga

## 2025-04-09 PROBLEM — E55.9 VITAMIN D DEFICIENCY: Status: ACTIVE | Noted: 2025-04-09

## 2025-04-09 LAB — 25(OH)D3 SERPL-MCNC: 23.1 NG/ML (ref 30–100)

## 2025-04-10 ENCOUNTER — TELEPHONE (OUTPATIENT)
Dept: BARIATRICS/WEIGHT MGMT | Facility: CLINIC | Age: 45
End: 2025-04-10
Payer: COMMERCIAL

## 2025-04-10 NOTE — TELEPHONE ENCOUNTER
We rec a referral on patient - Patient is already our patient. Last seen 07/06/2023. She wants to be seen for wt gain and possibly start MWL. I explained she could see our dietician first and then follow up with provider. She is going to contact her insurance to make sure the diet visit will be covered. She stated she would call us back. Closing referral as patient is already established with us

## 2025-04-11 ENCOUNTER — PATIENT ROUNDING (BHMG ONLY) (OUTPATIENT)
Dept: FAMILY MEDICINE CLINIC | Facility: CLINIC | Age: 45
End: 2025-04-11
Payer: COMMERCIAL

## 2025-05-29 RX ORDER — LEVOTHYROXINE SODIUM 50 UG/1
50 TABLET ORAL DAILY
Qty: 90 TABLET | Refills: 0 | Status: SHIPPED | OUTPATIENT
Start: 2025-05-29

## 2025-05-29 RX ORDER — BUPROPION HYDROCHLORIDE 100 MG/1
100 TABLET ORAL 3 TIMES DAILY
Qty: 90 TABLET | Refills: 0 | Status: SHIPPED | OUTPATIENT
Start: 2025-05-29

## 2025-08-25 RX ORDER — LEVOTHYROXINE SODIUM 50 UG/1
50 TABLET ORAL DAILY
Qty: 90 TABLET | Refills: 0 | Status: SHIPPED | OUTPATIENT
Start: 2025-08-25

## 2025-08-25 RX ORDER — BUPROPION HYDROCHLORIDE 100 MG/1
100 TABLET ORAL 3 TIMES DAILY
Qty: 90 TABLET | Refills: 0 | Status: SHIPPED | OUTPATIENT
Start: 2025-08-25

## (undated) DEVICE — 3M(TM) TEGADERM(TM) TRANSPARENT FILM DRESSING FRAME STYLE 1627: Brand: 3M™ TEGADERM™

## (undated) DEVICE — BITEBLOCK OMNI BLOC

## (undated) DEVICE — KT ORCA ORCAPOD DISP STRL

## (undated) DEVICE — SUT VIC 0 CT 36IN J958H

## (undated) DEVICE — SOL IRR H2O BTL 1000ML STRL

## (undated) DEVICE — MSK PROC CURAPLEX O2 2/ADAPT 7FT

## (undated) DEVICE — GLV SURG BIOGEL LTX PF 6 1/2

## (undated) DEVICE — SUT VIC PLS 0 CTX 36IN UD VCP978H

## (undated) DEVICE — ANTIBACTERIAL UNDYED BRAIDED (POLYGLACTIN 910), SYNTHETIC ABSORBABLE SUTURE: Brand: COATED VICRYL

## (undated) DEVICE — TUBING, SUCTION, 1/4" X 10', STRAIGHT: Brand: MEDLINE

## (undated) DEVICE — ADAPT CLN BIOGUARD AIR/H2O DISP

## (undated) DEVICE — FRCP BX RADJAW4 NDL 2.8 240CM LG OG BX40

## (undated) DEVICE — SLV SCD KN ADJ EXPRSS LG

## (undated) DEVICE — NDL BLNT 18G 1 1/2IN

## (undated) DEVICE — SUT VIC 3/0 CTI 36IN J944H

## (undated) DEVICE — KT ART BLD GAS QUICK DRAW

## (undated) DEVICE — STRIP SKIN CLOSEURE PROXI 1X5IN

## (undated) DEVICE — SENSR O2 OXIMAX FNGR A/ 18IN NONSTR

## (undated) DEVICE — LN SMPL CO2 SHTRM SD STREAM W/M LUER

## (undated) DEVICE — SUT VIC 0 CTX 36IN J978H

## (undated) DEVICE — UNDYED BRAIDED (POLYGLACTIN 910), SYNTHETIC ABSORBABLE SUTURE: Brand: COATED VICRYL

## (undated) DEVICE — KENDALL SCD EXPRESS SLEEVES, KNEE LENGTH, MEDIUM: Brand: KENDALL SCD